# Patient Record
Sex: FEMALE | Race: OTHER | HISPANIC OR LATINO | URBAN - METROPOLITAN AREA
[De-identification: names, ages, dates, MRNs, and addresses within clinical notes are randomized per-mention and may not be internally consistent; named-entity substitution may affect disease eponyms.]

---

## 2017-09-27 ENCOUNTER — EMERGENCY (EMERGENCY)
Facility: HOSPITAL | Age: 27
LOS: 1 days | Discharge: HOME | End: 2017-09-27
Attending: EMERGENCY MEDICINE
Payer: COMMERCIAL

## 2017-09-27 VITALS
OXYGEN SATURATION: 98 % | WEIGHT: 145.95 LBS | DIASTOLIC BLOOD PRESSURE: 81 MMHG | RESPIRATION RATE: 18 BRPM | HEIGHT: 61 IN | SYSTOLIC BLOOD PRESSURE: 116 MMHG | HEART RATE: 110 BPM | TEMPERATURE: 100 F

## 2017-09-27 LAB
ALBUMIN SERPL ELPH-MCNC: 4.2 G/DL — SIGNIFICANT CHANGE UP (ref 3.3–5.2)
ALP SERPL-CCNC: 61 U/L — SIGNIFICANT CHANGE UP (ref 40–120)
ALT FLD-CCNC: 55 U/L — HIGH
ANION GAP SERPL CALC-SCNC: 14 MMOL/L — SIGNIFICANT CHANGE UP (ref 5–17)
APPEARANCE UR: CLEAR — SIGNIFICANT CHANGE UP
AST SERPL-CCNC: 39 U/L — HIGH
BACTERIA # UR AUTO: ABNORMAL
BASOPHILS # BLD AUTO: 0 K/UL — SIGNIFICANT CHANGE UP (ref 0–0.2)
BASOPHILS NFR BLD AUTO: 0.6 % — SIGNIFICANT CHANGE UP (ref 0–2)
BILIRUB SERPL-MCNC: 0.1 MG/DL — LOW (ref 0.4–2)
BILIRUB UR-MCNC: NEGATIVE — SIGNIFICANT CHANGE UP
BUN SERPL-MCNC: 12 MG/DL — SIGNIFICANT CHANGE UP (ref 8–20)
CALCIUM SERPL-MCNC: 9 MG/DL — SIGNIFICANT CHANGE UP (ref 8.6–10.2)
CHLORIDE SERPL-SCNC: 100 MMOL/L — SIGNIFICANT CHANGE UP (ref 98–107)
CO2 SERPL-SCNC: 24 MMOL/L — SIGNIFICANT CHANGE UP (ref 22–29)
COLOR SPEC: YELLOW — SIGNIFICANT CHANGE UP
CREAT SERPL-MCNC: 0.76 MG/DL — SIGNIFICANT CHANGE UP (ref 0.5–1.3)
DIFF PNL FLD: ABNORMAL
EOSINOPHIL # BLD AUTO: 0.2 K/UL — SIGNIFICANT CHANGE UP (ref 0–0.5)
EOSINOPHIL NFR BLD AUTO: 3 % — SIGNIFICANT CHANGE UP (ref 0–6)
EPI CELLS # UR: ABNORMAL
GLUCOSE SERPL-MCNC: 98 MG/DL — SIGNIFICANT CHANGE UP (ref 70–115)
GLUCOSE UR QL: NEGATIVE MG/DL — SIGNIFICANT CHANGE UP
HCG UR QL: NEGATIVE — SIGNIFICANT CHANGE UP
HCT VFR BLD CALC: 41.2 % — SIGNIFICANT CHANGE UP (ref 37–47)
HGB BLD-MCNC: 13.7 G/DL — SIGNIFICANT CHANGE UP (ref 12–16)
KETONES UR-MCNC: NEGATIVE — SIGNIFICANT CHANGE UP
LEUKOCYTE ESTERASE UR-ACNC: ABNORMAL
LIDOCAIN IGE QN: 28 U/L — SIGNIFICANT CHANGE UP (ref 22–51)
LYMPHOCYTES # BLD AUTO: 1.8 K/UL — SIGNIFICANT CHANGE UP (ref 1–4.8)
LYMPHOCYTES # BLD AUTO: 29.2 % — SIGNIFICANT CHANGE UP (ref 20–55)
MCHC RBC-ENTMCNC: 28 PG — SIGNIFICANT CHANGE UP (ref 27–31)
MCHC RBC-ENTMCNC: 33.3 G/DL — SIGNIFICANT CHANGE UP (ref 32–36)
MCV RBC AUTO: 84.3 FL — SIGNIFICANT CHANGE UP (ref 81–99)
MONOCYTES # BLD AUTO: 0.5 K/UL — SIGNIFICANT CHANGE UP (ref 0–0.8)
MONOCYTES NFR BLD AUTO: 7.8 % — SIGNIFICANT CHANGE UP (ref 3–10)
NEUTROPHILS # BLD AUTO: 3.7 K/UL — SIGNIFICANT CHANGE UP (ref 1.8–8)
NEUTROPHILS NFR BLD AUTO: 59.1 % — SIGNIFICANT CHANGE UP (ref 37–73)
NITRITE UR-MCNC: NEGATIVE — SIGNIFICANT CHANGE UP
PH UR: 6 — SIGNIFICANT CHANGE UP (ref 5–8)
PLATELET # BLD AUTO: 239 K/UL — SIGNIFICANT CHANGE UP (ref 150–400)
POTASSIUM SERPL-MCNC: 4 MMOL/L — SIGNIFICANT CHANGE UP (ref 3.5–5.3)
POTASSIUM SERPL-SCNC: 4 MMOL/L — SIGNIFICANT CHANGE UP (ref 3.5–5.3)
PROT SERPL-MCNC: 8 G/DL — SIGNIFICANT CHANGE UP (ref 6.6–8.7)
PROT UR-MCNC: 15 MG/DL
RBC # BLD: 4.89 M/UL — SIGNIFICANT CHANGE UP (ref 4.4–5.2)
RBC # FLD: 13.5 % — SIGNIFICANT CHANGE UP (ref 11–15.6)
RBC CASTS # UR COMP ASSIST: ABNORMAL /HPF (ref 0–4)
SODIUM SERPL-SCNC: 138 MMOL/L — SIGNIFICANT CHANGE UP (ref 135–145)
SP GR SPEC: 1.02 — SIGNIFICANT CHANGE UP (ref 1.01–1.02)
UROBILINOGEN FLD QL: NEGATIVE MG/DL — SIGNIFICANT CHANGE UP
WBC # BLD: 6.3 K/UL — SIGNIFICANT CHANGE UP (ref 4.8–10.8)
WBC # FLD AUTO: 6.3 K/UL — SIGNIFICANT CHANGE UP (ref 4.8–10.8)
WBC UR QL: ABNORMAL

## 2017-09-27 PROCEDURE — 99284 EMERGENCY DEPT VISIT MOD MDM: CPT

## 2017-09-27 PROCEDURE — 80053 COMPREHEN METABOLIC PANEL: CPT

## 2017-09-27 PROCEDURE — 81025 URINE PREGNANCY TEST: CPT

## 2017-09-27 PROCEDURE — 83690 ASSAY OF LIPASE: CPT

## 2017-09-27 PROCEDURE — 81001 URINALYSIS AUTO W/SCOPE: CPT

## 2017-09-27 PROCEDURE — 36415 COLL VENOUS BLD VENIPUNCTURE: CPT

## 2017-09-27 PROCEDURE — 85027 COMPLETE CBC AUTOMATED: CPT

## 2017-09-27 PROCEDURE — 99284 EMERGENCY DEPT VISIT MOD MDM: CPT | Mod: 25

## 2017-09-27 PROCEDURE — 96374 THER/PROPH/DIAG INJ IV PUSH: CPT

## 2017-09-27 PROCEDURE — 96375 TX/PRO/DX INJ NEW DRUG ADDON: CPT

## 2017-09-27 RX ORDER — SODIUM CHLORIDE 9 MG/ML
1000 INJECTION INTRAMUSCULAR; INTRAVENOUS; SUBCUTANEOUS ONCE
Qty: 0 | Refills: 0 | Status: COMPLETED | OUTPATIENT
Start: 2017-09-27 | End: 2017-09-27

## 2017-09-27 RX ORDER — METOCLOPRAMIDE HCL 10 MG
10 TABLET ORAL ONCE
Qty: 0 | Refills: 0 | Status: COMPLETED | OUTPATIENT
Start: 2017-09-27 | End: 2017-09-27

## 2017-09-27 RX ORDER — FAMOTIDINE 10 MG/ML
20 INJECTION INTRAVENOUS ONCE
Qty: 0 | Refills: 0 | Status: COMPLETED | OUTPATIENT
Start: 2017-09-27 | End: 2017-09-27

## 2017-09-27 RX ORDER — KETOROLAC TROMETHAMINE 30 MG/ML
30 SYRINGE (ML) INJECTION ONCE
Qty: 0 | Refills: 0 | Status: DISCONTINUED | OUTPATIENT
Start: 2017-09-27 | End: 2017-09-27

## 2017-09-27 RX ORDER — SODIUM CHLORIDE 9 MG/ML
3 INJECTION INTRAMUSCULAR; INTRAVENOUS; SUBCUTANEOUS ONCE
Qty: 0 | Refills: 0 | Status: COMPLETED | OUTPATIENT
Start: 2017-09-27 | End: 2017-09-27

## 2017-09-27 RX ADMIN — Medication 30 MILLIGRAM(S): at 23:48

## 2017-09-27 RX ADMIN — Medication 104 MILLIGRAM(S): at 23:48

## 2017-09-27 RX ADMIN — SODIUM CHLORIDE 1000 MILLILITER(S): 9 INJECTION INTRAMUSCULAR; INTRAVENOUS; SUBCUTANEOUS at 23:48

## 2017-09-27 RX ADMIN — FAMOTIDINE 20 MILLIGRAM(S): 10 INJECTION INTRAVENOUS at 23:48

## 2017-09-27 RX ADMIN — SODIUM CHLORIDE 3 MILLILITER(S): 9 INJECTION INTRAMUSCULAR; INTRAVENOUS; SUBCUTANEOUS at 23:31

## 2017-09-27 NOTE — ED ADULT NURSE NOTE - OBJECTIVE STATEMENT
Pt. complaining of headache, dizziness, vomiting x 2 weeks.  Pt. states it started with her "throwing up all the food I was eating and then I started to get a cold and I developed a headache that has now traveled down my neck.  And recently my stomach has been bothering me".  Pt denies diarrhea.  Denies hematuria/dysuria.  Denies nausea.  Pt. states "it felt like I was burning up at work all day".  Pt. complaining of right lower quadrant tenderness upon palpation.

## 2017-09-27 NOTE — ED PROVIDER NOTE - OBJECTIVE STATEMENT
26 y/o F presents to ED c/o HA x 2 weeks. Associated Sx neck pain, LLQ pain x 2 days, N/V, decreased appetite, fever and dizziness. LNMP was end of August. Non-smoker. Non-alcohol drinker. Denies vaginal bleeding/ discharge, diarrhea, chills, SOB, CP, difficulty breathing, numbness and tingling.  PCP: Dr. Perez.

## 2017-09-27 NOTE — ED PROVIDER NOTE - MEDICAL DECISION MAKING DETAILS
26 y/o F presents to ED c/o HA and abdominal pain will get labs, IV fluids, Raglan, Toradol and re-evaluate.

## 2017-09-27 NOTE — ED ADULT TRIAGE NOTE - CHIEF COMPLAINT QUOTE
pt c/o migraine headaches, now radiating to neck, and lower abd pains. also feeling faint, chills, vomiting and no appetite. symptoms for 2 weeks. negative pregnancy test at home.

## 2017-09-28 VITALS
SYSTOLIC BLOOD PRESSURE: 111 MMHG | OXYGEN SATURATION: 100 % | RESPIRATION RATE: 18 BRPM | DIASTOLIC BLOOD PRESSURE: 76 MMHG | TEMPERATURE: 98 F | HEART RATE: 88 BPM

## 2018-11-29 ENCOUNTER — EMERGENCY (EMERGENCY)
Facility: HOSPITAL | Age: 28
LOS: 1 days | Discharge: HOME | End: 2018-11-29
Attending: STUDENT IN AN ORGANIZED HEALTH CARE EDUCATION/TRAINING PROGRAM
Payer: MEDICAID

## 2018-11-29 VITALS
SYSTOLIC BLOOD PRESSURE: 125 MMHG | TEMPERATURE: 98 F | HEIGHT: 61 IN | OXYGEN SATURATION: 100 % | HEART RATE: 88 BPM | WEIGHT: 160.06 LBS | DIASTOLIC BLOOD PRESSURE: 83 MMHG | RESPIRATION RATE: 18 BRPM

## 2018-11-29 PROCEDURE — 99284 EMERGENCY DEPT VISIT MOD MDM: CPT | Mod: 25

## 2018-11-30 VITALS
TEMPERATURE: 98 F | HEART RATE: 77 BPM | SYSTOLIC BLOOD PRESSURE: 108 MMHG | OXYGEN SATURATION: 98 % | RESPIRATION RATE: 16 BRPM | DIASTOLIC BLOOD PRESSURE: 75 MMHG

## 2018-11-30 LAB
ALBUMIN SERPL ELPH-MCNC: 4.2 G/DL — SIGNIFICANT CHANGE UP (ref 3.3–5.2)
ALP SERPL-CCNC: 51 U/L — SIGNIFICANT CHANGE UP (ref 40–120)
ALT FLD-CCNC: 47 U/L — HIGH
ANION GAP SERPL CALC-SCNC: 12 MMOL/L — SIGNIFICANT CHANGE UP (ref 5–17)
APPEARANCE UR: CLEAR — SIGNIFICANT CHANGE UP
AST SERPL-CCNC: 32 U/L — HIGH
BACTERIA # UR AUTO: ABNORMAL
BASOPHILS # BLD AUTO: 0 K/UL — SIGNIFICANT CHANGE UP (ref 0–0.2)
BASOPHILS NFR BLD AUTO: 0.2 % — SIGNIFICANT CHANGE UP (ref 0–2)
BILIRUB SERPL-MCNC: 0.2 MG/DL — LOW (ref 0.4–2)
BILIRUB UR-MCNC: NEGATIVE — SIGNIFICANT CHANGE UP
BUN SERPL-MCNC: 10 MG/DL — SIGNIFICANT CHANGE UP (ref 8–20)
C TRACH RRNA SPEC QL NAA+PROBE: SIGNIFICANT CHANGE UP
CALCIUM SERPL-MCNC: 8.9 MG/DL — SIGNIFICANT CHANGE UP (ref 8.6–10.2)
CHLORIDE SERPL-SCNC: 105 MMOL/L — SIGNIFICANT CHANGE UP (ref 98–107)
CO2 SERPL-SCNC: 23 MMOL/L — SIGNIFICANT CHANGE UP (ref 22–29)
COLOR SPEC: YELLOW — SIGNIFICANT CHANGE UP
CREAT SERPL-MCNC: 0.55 MG/DL — SIGNIFICANT CHANGE UP (ref 0.5–1.3)
DIFF PNL FLD: ABNORMAL
EOSINOPHIL # BLD AUTO: 0.4 K/UL — SIGNIFICANT CHANGE UP (ref 0–0.5)
EOSINOPHIL NFR BLD AUTO: 3.2 % — SIGNIFICANT CHANGE UP (ref 0–6)
EPI CELLS # UR: SIGNIFICANT CHANGE UP
GLUCOSE SERPL-MCNC: 109 MG/DL — SIGNIFICANT CHANGE UP (ref 70–115)
GLUCOSE UR QL: NEGATIVE MG/DL — SIGNIFICANT CHANGE UP
HCG UR QL: NEGATIVE — SIGNIFICANT CHANGE UP
HCT VFR BLD CALC: 42.8 % — SIGNIFICANT CHANGE UP (ref 37–47)
HGB BLD-MCNC: 13.7 G/DL — SIGNIFICANT CHANGE UP (ref 12–16)
KETONES UR-MCNC: NEGATIVE — SIGNIFICANT CHANGE UP
LEUKOCYTE ESTERASE UR-ACNC: ABNORMAL
LIDOCAIN IGE QN: 33 U/L — SIGNIFICANT CHANGE UP (ref 22–51)
LYMPHOCYTES # BLD AUTO: 4.7 K/UL — SIGNIFICANT CHANGE UP (ref 1–4.8)
LYMPHOCYTES # BLD AUTO: 41.2 % — SIGNIFICANT CHANGE UP (ref 20–55)
MCHC RBC-ENTMCNC: 28.3 PG — SIGNIFICANT CHANGE UP (ref 27–31)
MCHC RBC-ENTMCNC: 32 G/DL — SIGNIFICANT CHANGE UP (ref 32–36)
MCV RBC AUTO: 88.4 FL — SIGNIFICANT CHANGE UP (ref 81–99)
MONOCYTES # BLD AUTO: 0.6 K/UL — SIGNIFICANT CHANGE UP (ref 0–0.8)
MONOCYTES NFR BLD AUTO: 4.8 % — SIGNIFICANT CHANGE UP (ref 3–10)
N GONORRHOEA RRNA SPEC QL NAA+PROBE: SIGNIFICANT CHANGE UP
NEUTROPHILS # BLD AUTO: 5.8 K/UL — SIGNIFICANT CHANGE UP (ref 1.8–8)
NEUTROPHILS NFR BLD AUTO: 50.3 % — SIGNIFICANT CHANGE UP (ref 37–73)
NITRITE UR-MCNC: NEGATIVE — SIGNIFICANT CHANGE UP
PH UR: 6 — SIGNIFICANT CHANGE UP (ref 5–8)
PLATELET # BLD AUTO: 270 K/UL — SIGNIFICANT CHANGE UP (ref 150–400)
POTASSIUM SERPL-MCNC: 4 MMOL/L — SIGNIFICANT CHANGE UP (ref 3.5–5.3)
POTASSIUM SERPL-SCNC: 4 MMOL/L — SIGNIFICANT CHANGE UP (ref 3.5–5.3)
PROT SERPL-MCNC: 7.5 G/DL — SIGNIFICANT CHANGE UP (ref 6.6–8.7)
PROT UR-MCNC: 15 MG/DL
RBC # BLD: 4.84 M/UL — SIGNIFICANT CHANGE UP (ref 4.4–5.2)
RBC # FLD: 13.3 % — SIGNIFICANT CHANGE UP (ref 11–15.6)
RBC CASTS # UR COMP ASSIST: SIGNIFICANT CHANGE UP /HPF (ref 0–4)
SODIUM SERPL-SCNC: 140 MMOL/L — SIGNIFICANT CHANGE UP (ref 135–145)
SP GR SPEC: 1.02 — SIGNIFICANT CHANGE UP (ref 1.01–1.02)
SPECIMEN SOURCE: SIGNIFICANT CHANGE UP
UROBILINOGEN FLD QL: NEGATIVE MG/DL — SIGNIFICANT CHANGE UP
WBC # BLD: 11.5 K/UL — HIGH (ref 4.8–10.8)
WBC # FLD AUTO: 11.5 K/UL — HIGH (ref 4.8–10.8)
WBC UR QL: SIGNIFICANT CHANGE UP

## 2018-11-30 PROCEDURE — 36415 COLL VENOUS BLD VENIPUNCTURE: CPT

## 2018-11-30 PROCEDURE — 81001 URINALYSIS AUTO W/SCOPE: CPT

## 2018-11-30 PROCEDURE — 74177 CT ABD & PELVIS W/CONTRAST: CPT | Mod: 26

## 2018-11-30 PROCEDURE — 80053 COMPREHEN METABOLIC PANEL: CPT

## 2018-11-30 PROCEDURE — 87491 CHLMYD TRACH DNA AMP PROBE: CPT

## 2018-11-30 PROCEDURE — 85027 COMPLETE CBC AUTOMATED: CPT

## 2018-11-30 PROCEDURE — 74177 CT ABD & PELVIS W/CONTRAST: CPT

## 2018-11-30 PROCEDURE — 99284 EMERGENCY DEPT VISIT MOD MDM: CPT | Mod: 25

## 2018-11-30 PROCEDURE — 87591 N.GONORRHOEAE DNA AMP PROB: CPT

## 2018-11-30 PROCEDURE — 96374 THER/PROPH/DIAG INJ IV PUSH: CPT | Mod: XU

## 2018-11-30 PROCEDURE — 83690 ASSAY OF LIPASE: CPT

## 2018-11-30 PROCEDURE — 96375 TX/PRO/DX INJ NEW DRUG ADDON: CPT

## 2018-11-30 PROCEDURE — 81025 URINE PREGNANCY TEST: CPT

## 2018-11-30 PROCEDURE — 96361 HYDRATE IV INFUSION ADD-ON: CPT

## 2018-11-30 RX ORDER — SODIUM CHLORIDE 9 MG/ML
1000 INJECTION INTRAMUSCULAR; INTRAVENOUS; SUBCUTANEOUS ONCE
Qty: 0 | Refills: 0 | Status: COMPLETED | OUTPATIENT
Start: 2018-11-30 | End: 2018-11-30

## 2018-11-30 RX ORDER — ONDANSETRON 8 MG/1
4 TABLET, FILM COATED ORAL ONCE
Qty: 0 | Refills: 0 | Status: COMPLETED | OUTPATIENT
Start: 2018-11-30 | End: 2018-11-30

## 2018-11-30 RX ORDER — SODIUM CHLORIDE 9 MG/ML
1000 INJECTION INTRAMUSCULAR; INTRAVENOUS; SUBCUTANEOUS
Qty: 0 | Refills: 0 | Status: DISCONTINUED | OUTPATIENT
Start: 2018-11-30 | End: 2018-12-04

## 2018-11-30 RX ORDER — OMEPRAZOLE 10 MG/1
1 CAPSULE, DELAYED RELEASE ORAL
Qty: 14 | Refills: 0
Start: 2018-11-30 | End: 2018-12-13

## 2018-11-30 RX ORDER — KETOROLAC TROMETHAMINE 30 MG/ML
30 SYRINGE (ML) INJECTION ONCE
Qty: 0 | Refills: 0 | Status: DISCONTINUED | OUTPATIENT
Start: 2018-11-30 | End: 2018-11-30

## 2018-11-30 RX ORDER — IBUPROFEN 200 MG
1 TABLET ORAL
Qty: 20 | Refills: 0
Start: 2018-11-30 | End: 2018-12-04

## 2018-11-30 RX ORDER — SODIUM CHLORIDE 9 MG/ML
3 INJECTION INTRAMUSCULAR; INTRAVENOUS; SUBCUTANEOUS ONCE
Qty: 0 | Refills: 0 | Status: COMPLETED | OUTPATIENT
Start: 2018-11-30 | End: 2018-11-30

## 2018-11-30 RX ADMIN — Medication 30 MILLIGRAM(S): at 05:08

## 2018-11-30 RX ADMIN — SODIUM CHLORIDE 3 MILLILITER(S): 9 INJECTION INTRAMUSCULAR; INTRAVENOUS; SUBCUTANEOUS at 03:05

## 2018-11-30 RX ADMIN — ONDANSETRON 4 MILLIGRAM(S): 8 TABLET, FILM COATED ORAL at 05:08

## 2018-11-30 RX ADMIN — SODIUM CHLORIDE 1000 MILLILITER(S): 9 INJECTION INTRAMUSCULAR; INTRAVENOUS; SUBCUTANEOUS at 03:05

## 2018-11-30 RX ADMIN — SODIUM CHLORIDE 1000 MILLILITER(S): 9 INJECTION INTRAMUSCULAR; INTRAVENOUS; SUBCUTANEOUS at 04:30

## 2018-11-30 RX ADMIN — SODIUM CHLORIDE 125 MILLILITER(S): 9 INJECTION INTRAMUSCULAR; INTRAVENOUS; SUBCUTANEOUS at 05:11

## 2018-11-30 NOTE — ED PROVIDER NOTE - MEDICAL DECISION MAKING DETAILS
Will evaluate patient's chronic abdominal pain with CT and labs, if no acute process noted, will have patient followup with PMD.

## 2018-11-30 NOTE — ED ADULT NURSE REASSESSMENT NOTE - NS ED NURSE REASSESS COMMENT FT1
results received and reviewed with patient by Dr Fernandes. Discharge instructions received and reviewed with patient, patient verbalized understanding of all instructions given. Iv removed, safety maintained.

## 2018-11-30 NOTE — ED PROVIDER NOTE - GASTROINTESTINAL, MLM
Abdomen soft, non-tender, no guarding. Abdomen soft, diffuse abdominal tenderness, no guarding or rebound.

## 2018-11-30 NOTE — ED ADULT NURSE NOTE - OBJECTIVE STATEMENT
assumed care of patient at 0300, alert and oriented x4, c/o LLQ abdominal pain x2 weeks worsening tonight. c/o nausea, denies vomiting. Patient seen and evaluated by MD, Iv placed labs sent, clear yellow urine sample obtained and sent. assumed care of patient at 0300, alert and oriented x4, c/o LLQ abdominal pain x2 weeks worsening tonight. c/o nausea, denies vomiting. Patient seen and evaluated by MD, Iv placed labs sent, clear yellow urine sample obtained and sent. pt educated on plan of care, pt able to successfully teach back plan of care to RN, RN will continue to reeducate pt during hospital stay.

## 2018-11-30 NOTE — ED ADULT NURSE NOTE - CHPI ED NUR SYMPTOMS NEG
no abdominal distension/no vomiting/no diarrhea/no burning urination/no hematuria/no chills/no dysuria/no fever/no blood in stool

## 2018-11-30 NOTE — ED PROVIDER NOTE - CONSTITUTIONAL, MLM
normal... Well appearing, well nourished, awake, alert, oriented to person, place, time/situation and in no apparent distress. Well appearing, well nourished and in no apparent distress.

## 2018-11-30 NOTE — ED PROVIDER NOTE - OBJECTIVE STATEMENT
27 y/o F presents to ED c/o abdominal pain, bloating and intermittent hematuria onset 2 weeks ago. Is also experiencing nausea, dizziness described as room spinning sensation and headaches. Has a history of GI related problems, has been evaluated by her PMD, but was told that "nothing was wrong". States that due to insurance complications, she has not seen a doctor in over 6 months. Has been taking Tylenol, Advil, and drinking ginger ale and tea with no relief. Denies recent fevers or chills, vomiting, diarrhea, blood in the stool or pain/burning with urination. NKDA. No further acute complaints at this time. 27 y/o F presents to ED c/o abdominal pain, bloating and intermittent hematuria onset several months ago, worsening and becoming more severe over the past 2 weeks. Is also experiencing nausea, dizziness described as room spinning sensation and headaches. Has a history of GI related problems, has been evaluated by her PMD, but was told that "nothing was wrong". States that due to insurance complications, she has not seen a doctor in over 6 months. Has been taking Tylenol, Advil, and drinking ginger ale and tea with no relief. Denies recent fevers or chills, vomiting, diarrhea, blood in the stool or pain/burning with urination. NKDA. No further acute complaints at this time. 27 y/o F presents to ED c/o abdominal pain, bloating and intermittent hematuria onset several months ago, worsening and becoming more severe over the past 2 weeks. Is also experiencing nausea, dizziness described as room spinning sensation and headaches. Has a history of GI related problems, has been evaluated by her PMD, but was told that "nothing was wrong" but states that due to insurance complications, she has not seen a doctor in over 6 months. Has been taking Tylenol, Advil, and drinking ginger ale and tea with no relief. Denies recent fevers or chills, vomiting, diarrhea, blood in the stool or pain/burning with urination. NKDA. No further acute complaints at this time.

## 2019-02-25 ENCOUNTER — ASOB RESULT (OUTPATIENT)
Age: 29
End: 2019-02-25

## 2019-02-25 ENCOUNTER — APPOINTMENT (OUTPATIENT)
Dept: ANTEPARTUM | Facility: CLINIC | Age: 29
End: 2019-02-25
Payer: MEDICAID

## 2019-02-25 PROCEDURE — 76813 OB US NUCHAL MEAS 1 GEST: CPT

## 2019-02-25 PROCEDURE — 36416 COLLJ CAPILLARY BLOOD SPEC: CPT

## 2019-03-01 LAB
1ST TRIMESTER DATA: NORMAL
ADDENDUM DOC: NORMAL
AFP PNL SERPL: NORMAL
AFP SERPL-ACNC: NORMAL
CLINICAL BIOCHEMIST REVIEW: NORMAL
FREE BETA HCG 1ST TRIMESTER: NORMAL
Lab: NORMAL
NASAL BONE: PRESENT
NOTES NTD: NORMAL
NT: NORMAL
PAPP-A SERPL-ACNC: NORMAL
TRISOMY 18/3: NORMAL

## 2019-03-06 ENCOUNTER — EMERGENCY (EMERGENCY)
Facility: HOSPITAL | Age: 29
LOS: 1 days | Discharge: HOME | End: 2019-03-06
Attending: EMERGENCY MEDICINE
Payer: COMMERCIAL

## 2019-03-06 VITALS
OXYGEN SATURATION: 100 % | RESPIRATION RATE: 18 BRPM | SYSTOLIC BLOOD PRESSURE: 116 MMHG | HEIGHT: 61 IN | WEIGHT: 169.98 LBS | DIASTOLIC BLOOD PRESSURE: 73 MMHG | HEART RATE: 87 BPM | TEMPERATURE: 98 F

## 2019-03-06 PROCEDURE — 99284 EMERGENCY DEPT VISIT MOD MDM: CPT | Mod: 25

## 2019-03-06 RX ORDER — METOCLOPRAMIDE HCL 10 MG
10 TABLET ORAL ONCE
Qty: 0 | Refills: 0 | Status: COMPLETED | OUTPATIENT
Start: 2019-03-06 | End: 2019-03-06

## 2019-03-06 RX ORDER — SODIUM CHLORIDE 9 MG/ML
1000 INJECTION INTRAMUSCULAR; INTRAVENOUS; SUBCUTANEOUS ONCE
Qty: 0 | Refills: 0 | Status: COMPLETED | OUTPATIENT
Start: 2019-03-06 | End: 2019-03-06

## 2019-03-06 NOTE — ED ADULT TRIAGE NOTE - CHIEF COMPLAINT QUOTE
I am 13 weeks preg.  my boiler is being fix we have no heat.  I can smel the gas in the house. I am having abd pain and vomiting

## 2019-03-07 VITALS
TEMPERATURE: 99 F | SYSTOLIC BLOOD PRESSURE: 113 MMHG | OXYGEN SATURATION: 98 % | RESPIRATION RATE: 18 BRPM | DIASTOLIC BLOOD PRESSURE: 75 MMHG | HEART RATE: 95 BPM

## 2019-03-07 LAB
ALBUMIN SERPL ELPH-MCNC: 3.4 G/DL — SIGNIFICANT CHANGE UP (ref 3.3–5.2)
ALP SERPL-CCNC: 42 U/L — SIGNIFICANT CHANGE UP (ref 40–120)
ALT FLD-CCNC: 30 U/L — SIGNIFICANT CHANGE UP
ANION GAP SERPL CALC-SCNC: 11 MMOL/L — SIGNIFICANT CHANGE UP (ref 5–17)
APPEARANCE UR: CLEAR — SIGNIFICANT CHANGE UP
AST SERPL-CCNC: 30 U/L — SIGNIFICANT CHANGE UP
BASOPHILS # BLD AUTO: 0 K/UL — SIGNIFICANT CHANGE UP (ref 0–0.2)
BASOPHILS NFR BLD AUTO: 0.2 % — SIGNIFICANT CHANGE UP (ref 0–2)
BILIRUB SERPL-MCNC: <0.2 MG/DL — LOW (ref 0.4–2)
BILIRUB UR-MCNC: NEGATIVE — SIGNIFICANT CHANGE UP
BUN SERPL-MCNC: 4 MG/DL — LOW (ref 8–20)
CALCIUM SERPL-MCNC: 8.7 MG/DL — SIGNIFICANT CHANGE UP (ref 8.6–10.2)
CHLORIDE SERPL-SCNC: 103 MMOL/L — SIGNIFICANT CHANGE UP (ref 98–107)
CO2 SERPL-SCNC: 21 MMOL/L — LOW (ref 22–29)
COHGB MFR BLDV: 1.8 % — SIGNIFICANT CHANGE UP (ref 0–2)
COLOR SPEC: YELLOW — SIGNIFICANT CHANGE UP
CREAT SERPL-MCNC: 0.35 MG/DL — LOW (ref 0.5–1.3)
DIFF PNL FLD: NEGATIVE — SIGNIFICANT CHANGE UP
EOSINOPHIL # BLD AUTO: 0.3 K/UL — SIGNIFICANT CHANGE UP (ref 0–0.5)
EOSINOPHIL NFR BLD AUTO: 2.4 % — SIGNIFICANT CHANGE UP (ref 0–6)
EPI CELLS # UR: SIGNIFICANT CHANGE UP
GLUCOSE SERPL-MCNC: 96 MG/DL — SIGNIFICANT CHANGE UP (ref 70–115)
GLUCOSE UR QL: NEGATIVE MG/DL — SIGNIFICANT CHANGE UP
HCT VFR BLD CALC: 37.9 % — SIGNIFICANT CHANGE UP (ref 37–47)
HGB BLD CALC-MCNC: 12.1 G/DL — SIGNIFICANT CHANGE UP (ref 11.5–15.5)
HGB BLD-MCNC: 12.5 G/DL — SIGNIFICANT CHANGE UP (ref 12–16)
KETONES UR-MCNC: ABNORMAL
LEUKOCYTE ESTERASE UR-ACNC: ABNORMAL
LIDOCAIN IGE QN: 25 U/L — SIGNIFICANT CHANGE UP (ref 22–51)
LYMPHOCYTES # BLD AUTO: 32.1 % — SIGNIFICANT CHANGE UP (ref 20–55)
LYMPHOCYTES # BLD AUTO: 4.2 K/UL — SIGNIFICANT CHANGE UP (ref 1–4.8)
MCHC RBC-ENTMCNC: 27.9 PG — SIGNIFICANT CHANGE UP (ref 27–31)
MCHC RBC-ENTMCNC: 33 G/DL — SIGNIFICANT CHANGE UP (ref 32–36)
MCV RBC AUTO: 84.6 FL — SIGNIFICANT CHANGE UP (ref 81–99)
MONOCYTES # BLD AUTO: 0.7 K/UL — SIGNIFICANT CHANGE UP (ref 0–0.8)
MONOCYTES NFR BLD AUTO: 5.2 % — SIGNIFICANT CHANGE UP (ref 3–10)
NEUTROPHILS # BLD AUTO: 7.7 K/UL — SIGNIFICANT CHANGE UP (ref 1.8–8)
NEUTROPHILS NFR BLD AUTO: 59.1 % — SIGNIFICANT CHANGE UP (ref 37–73)
NITRITE UR-MCNC: NEGATIVE — SIGNIFICANT CHANGE UP
PH UR: 7 — SIGNIFICANT CHANGE UP (ref 5–8)
PLATELET # BLD AUTO: 271 K/UL — SIGNIFICANT CHANGE UP (ref 150–400)
POTASSIUM SERPL-MCNC: 3.8 MMOL/L — SIGNIFICANT CHANGE UP (ref 3.5–5.3)
POTASSIUM SERPL-SCNC: 3.8 MMOL/L — SIGNIFICANT CHANGE UP (ref 3.5–5.3)
PROT SERPL-MCNC: 6.7 G/DL — SIGNIFICANT CHANGE UP (ref 6.6–8.7)
PROT UR-MCNC: NEGATIVE MG/DL — SIGNIFICANT CHANGE UP
RBC # BLD: 4.48 M/UL — SIGNIFICANT CHANGE UP (ref 4.4–5.2)
RBC # FLD: 13.2 % — SIGNIFICANT CHANGE UP (ref 11–15.6)
RBC CASTS # UR COMP ASSIST: SIGNIFICANT CHANGE UP /HPF (ref 0–4)
SODIUM SERPL-SCNC: 135 MMOL/L — SIGNIFICANT CHANGE UP (ref 135–145)
SP GR SPEC: 1 — LOW (ref 1.01–1.02)
UROBILINOGEN FLD QL: NEGATIVE MG/DL — SIGNIFICANT CHANGE UP
WBC # BLD: 13.1 K/UL — HIGH (ref 4.8–10.8)
WBC # FLD AUTO: 13.1 K/UL — HIGH (ref 4.8–10.8)
WBC UR QL: SIGNIFICANT CHANGE UP

## 2019-03-07 PROCEDURE — 82375 ASSAY CARBOXYHB QUANT: CPT

## 2019-03-07 PROCEDURE — 36415 COLL VENOUS BLD VENIPUNCTURE: CPT

## 2019-03-07 PROCEDURE — 96361 HYDRATE IV INFUSION ADD-ON: CPT

## 2019-03-07 PROCEDURE — 80053 COMPREHEN METABOLIC PANEL: CPT

## 2019-03-07 PROCEDURE — 99284 EMERGENCY DEPT VISIT MOD MDM: CPT | Mod: 25

## 2019-03-07 PROCEDURE — 76817 TRANSVAGINAL US OBSTETRIC: CPT | Mod: 26

## 2019-03-07 PROCEDURE — 85027 COMPLETE CBC AUTOMATED: CPT

## 2019-03-07 PROCEDURE — 83690 ASSAY OF LIPASE: CPT

## 2019-03-07 PROCEDURE — 76801 OB US < 14 WKS SINGLE FETUS: CPT

## 2019-03-07 PROCEDURE — 87086 URINE CULTURE/COLONY COUNT: CPT

## 2019-03-07 PROCEDURE — 76801 OB US < 14 WKS SINGLE FETUS: CPT | Mod: 26

## 2019-03-07 PROCEDURE — 96374 THER/PROPH/DIAG INJ IV PUSH: CPT

## 2019-03-07 PROCEDURE — 76817 TRANSVAGINAL US OBSTETRIC: CPT

## 2019-03-07 PROCEDURE — 81001 URINALYSIS AUTO W/SCOPE: CPT

## 2019-03-07 RX ORDER — ACETAMINOPHEN 500 MG
650 TABLET ORAL ONCE
Qty: 0 | Refills: 0 | Status: COMPLETED | OUTPATIENT
Start: 2019-03-07 | End: 2019-03-07

## 2019-03-07 RX ADMIN — Medication 10 MILLIGRAM(S): at 00:29

## 2019-03-07 RX ADMIN — SODIUM CHLORIDE 1000 MILLILITER(S): 9 INJECTION INTRAMUSCULAR; INTRAVENOUS; SUBCUTANEOUS at 01:47

## 2019-03-07 RX ADMIN — SODIUM CHLORIDE 1000 MILLILITER(S): 9 INJECTION INTRAMUSCULAR; INTRAVENOUS; SUBCUTANEOUS at 00:29

## 2019-03-07 NOTE — ED ADULT NURSE NOTE - NSIMPLEMENTINTERV_GEN_ALL_ED
Implemented All Universal Safety Interventions:  [place] to call system. Call bell, personal items and telephone within reach. Instruct patient to call for assistance. Room bathroom lighting operational. Non-slip footwear when patient is off stretcher. Physically safe environment: no spills, clutter or unnecessary equipment. Stretcher in lowest position, wheels locked, appropriate side rails in place.

## 2019-03-07 NOTE — ED PROVIDER NOTE - CHPI ED SYMPTOMS NEG
no fever/no diarrhea/no dysuria/no hematuria/no burning urination/no chills no diarrhea/no fever/no dysuria/no burning urination/no vomiting/no hematuria/no chills

## 2019-03-07 NOTE — ED ADULT NURSE NOTE - OBJECTIVE STATEMENT
Assumed pt care 0020. Pt lying in bed, no acute distress noted, son and  at bedside. Pt A+OX3. Pt states today "someone came over to fix the boiler, gasoline smell was throughout the entire house, I began having a headache and abdominal pain, I was scared because I am pregnant." On assessment pt with clear lung sounds b/l, abdomen soft, nontender, nondistended, pt denies abdominal pain currently. Pt states she still has headache 5/10. Pt medicated as ordered, and educated on plan of care: awaiting US. Pt able to successfully teach back plan of care to RN. RN will continue to reeducate pt during hospital stay.

## 2019-03-07 NOTE — ED PROVIDER NOTE - OBJECTIVE STATEMENT
27 y/o F, 13 weeks pregnant, with no pertinent medical hx, presents to the ED c/o lower abdominal pain, onset yesterday.  Pain is non radiating and dull and achy in nature.  Associated sx include dizziness, nausea and non bloody, non bilious emesis.  Pt states that she is currently living in her mother's basement and is currently having the boiler fixed.  Pt states that at approximately 6:00PM yesterday she began to smell gas.  Notes sx onset shortly after.   LNMP 12/1.  Denies fever, chills, chest pain, SOB, cough, diarrhea, dysuria, vaginal bleeding, vaginal discharge, back pain, or HA. 27 y/o F, , 13 weeks pregnant by LMP , with no pertinent medical hx, presents to the ED c/o lower abdominal pain, onset yesterday.  Pain is non radiating and dull and achy in nature.  Associated sx include dizziness, nausea but no vomiting.  Pt states that she is currently living in her mother's basement and is currently having the boiler fixed.  Pt states that at approximately 6:00PM yesterday she began to smell gas.  Notes sx onset shortly after. No other family had similar symptoms, pt was the only one at home. No sick contacts at home.  Denies fever, chills, chest pain, SOB, cough, diarrhea, dysuria, vaginal bleeding, vaginal discharge, back pain, or HA.

## 2019-03-07 NOTE — ED PROVIDER NOTE - ATTENDING CONTRIBUTION TO CARE
ATTENDING MD: VENUS, David Nolan, have seen and evaluated this patient with the advance practice clinician.  I have discussed the history, exam ,and aspects of care with the APC.  I have reviewed the APC's note. I agree with the findings  unless other wise stated in the HPI, PE, MDM, and progress notes.    GEN: awake, alert, nontoxic  HEENT: NCAT, eyes clear, mucus membranes are dry, neck supple  CV: normal rate, regular rhythm  RESP: unlabored, lungs clear to auscultation bilaterally, equal breath sounds bilaterally  GI: abdomen soft, nondistended, minimal suprapubic tenderness no RLQ or LLQ tenderness, no rebound, no guarding. benign abdomen  : no CVAT  SKIN: warm, dry, no rash,    MDM: likely gastro, will check UA, US for appropriate pregnancy location and r/o molar preg, supportive care, reassess    Charts made in real time. Please forgive typos.

## 2019-03-07 NOTE — ED PROVIDER NOTE - PROGRESS NOTE DETAILS
PA NOTE: labs wnl, pt feeling better at this time, no lightheadedness, no abdominal pain, u/s shows intrauterine pregnancy. pt to be d/c f/u with ob

## 2019-03-07 NOTE — ED PROVIDER NOTE - CLINICAL SUMMARY MEDICAL DECISION MAKING FREE TEXT BOX
Pt 13 weeks pregnant, will obtain US to confirm viable pregnancy, basic labs, give Tylenol and Reglan, and reeval

## 2019-03-08 LAB
CULTURE RESULTS: SIGNIFICANT CHANGE UP
SPECIMEN SOURCE: SIGNIFICANT CHANGE UP

## 2019-03-25 ENCOUNTER — LABORATORY RESULT (OUTPATIENT)
Age: 29
End: 2019-03-25

## 2019-03-25 ENCOUNTER — APPOINTMENT (OUTPATIENT)
Dept: ANTEPARTUM | Facility: CLINIC | Age: 29
End: 2019-03-25

## 2019-03-29 ENCOUNTER — APPOINTMENT (OUTPATIENT)
Dept: ANTEPARTUM | Facility: CLINIC | Age: 29
End: 2019-03-29
Payer: MEDICAID

## 2019-03-29 ENCOUNTER — APPOINTMENT (OUTPATIENT)
Dept: MATERNAL FETAL MEDICINE | Facility: CLINIC | Age: 29
End: 2019-03-29
Payer: MEDICAID

## 2019-03-29 ENCOUNTER — ASOB RESULT (OUTPATIENT)
Age: 29
End: 2019-03-29

## 2019-03-29 VITALS
HEIGHT: 61 IN | BODY MASS INDEX: 33.14 KG/M2 | DIASTOLIC BLOOD PRESSURE: 80 MMHG | SYSTOLIC BLOOD PRESSURE: 120 MMHG | WEIGHT: 175.5 LBS

## 2019-03-29 DIAGNOSIS — O99.212 OBESITY COMPLICATING PREGNANCY, SECOND TRIMESTER: ICD-10-CM

## 2019-03-29 DIAGNOSIS — Z87.59 PERSONAL HISTORY OF OTHER COMPLICATIONS OF PREGNANCY, CHILDBIRTH AND THE PUERPERIUM: ICD-10-CM

## 2019-03-29 DIAGNOSIS — Z83.3 FAMILY HISTORY OF DIABETES MELLITUS: ICD-10-CM

## 2019-03-29 DIAGNOSIS — Z3A.16 16 WEEKS GESTATION OF PREGNANCY: ICD-10-CM

## 2019-03-29 DIAGNOSIS — Z78.9 OTHER SPECIFIED HEALTH STATUS: ICD-10-CM

## 2019-03-29 DIAGNOSIS — Z82.49 FAMILY HISTORY OF ISCHEMIC HEART DISEASE AND OTHER DISEASES OF THE CIRCULATORY SYSTEM: ICD-10-CM

## 2019-03-29 DIAGNOSIS — Z87.51 PERSONAL HISTORY OF PRE-TERM LABOR: ICD-10-CM

## 2019-03-29 DIAGNOSIS — O09.211 SUPERVISION OF PREGNANCY WITH HISTORY OF PRE-TERM LABOR, FIRST TRIMESTER: ICD-10-CM

## 2019-03-29 PROCEDURE — 76805 OB US >/= 14 WKS SNGL FETUS: CPT

## 2019-03-29 PROCEDURE — 99215 OFFICE O/P EST HI 40 MIN: CPT | Mod: TH

## 2019-03-29 PROCEDURE — 76817 TRANSVAGINAL US OBSTETRIC: CPT

## 2019-03-29 RX ORDER — VITAMIN C, CALCIUM, IRON, VITAMIN D3, VITAMIN E, VITAMIN B1, VITAMIN B2, VITAMIN B3, VITAMIN B6, FOLIC ACID, IODINE, ZINC, COPPER, DOCUSATE SODIUM, DOCOSAHEXAENOIC ACID (DHA) 27-1-50 MG
KIT ORAL
Refills: 0 | Status: ACTIVE | COMMUNITY

## 2019-03-29 NOTE — OB HISTORY
[Pregnancy History] : patient received anesthesia [LMP: ___] : LMP: [unfilled] [JOSE L: ___] : JOSE L: [unfilled] [EGA: ___ wks] : EGA: [unfilled] wks [Spontaneous] : Spontaneous conception [Sonogram] : sonogram [at ___ wks] : at [unfilled] weeks [Definite:  ___ (Date)] : the last menstrual period was [unfilled] [Normal Amount/Duration] : was of a normal amount and duration [Regular Cycle Intervals] : periods have been regular [Frequency: Q ___ days] : menstrual periods occur approximately every [unfilled] days [Menarche Age: ____] : age at menarche was [unfilled] [___] : Living: [unfilled] [FreeTextEntry1] : Her prenatal records were not available for my review. [Spotting Between  Menses] : no spotting between menses [Menstrual Cramps] : no menstrual cramps [On BCP at conception] : the patient was not on BCP at conception

## 2019-03-29 NOTE — VITALS
[LMP (date): ___] : LMP was on [unfilled] [GA =___ Weeks] : which calculates to a GA of [unfilled] weeks [GA= ___ Days] : and [unfilled] day(s) [JOSE L by LMP (date): ___] : The calculated JOSE L by LMP is [unfilled] [By LMP] : this is the final JOSE L

## 2019-03-29 NOTE — FAMILY HISTORY
[Age 35+ During Pregnancy] : not 35 or over during pregnancy [Reported Family History Of Birth Defects] : no congenital heart defects [Jamal-Sachs Carrier] : no Jamal-Sachs [Family History] : no mental retardation/autism [Reported Family History Of Genetic Disease] : no history of child defect in child of baby father

## 2019-04-22 ENCOUNTER — APPOINTMENT (OUTPATIENT)
Dept: ANTEPARTUM | Facility: CLINIC | Age: 29
End: 2019-04-22
Payer: MEDICAID

## 2019-04-22 ENCOUNTER — ASOB RESULT (OUTPATIENT)
Age: 29
End: 2019-04-22

## 2019-04-22 PROCEDURE — 76817 TRANSVAGINAL US OBSTETRIC: CPT

## 2019-04-22 PROCEDURE — 76811 OB US DETAILED SNGL FETUS: CPT

## 2019-05-07 ENCOUNTER — APPOINTMENT (OUTPATIENT)
Age: 29
End: 2019-05-07
Payer: MEDICAID

## 2019-05-07 ENCOUNTER — ASOB RESULT (OUTPATIENT)
Age: 29
End: 2019-05-07

## 2019-05-07 PROCEDURE — 76816 OB US FOLLOW-UP PER FETUS: CPT

## 2019-07-02 ENCOUNTER — ASOB RESULT (OUTPATIENT)
Age: 29
End: 2019-07-02

## 2019-07-02 ENCOUNTER — APPOINTMENT (OUTPATIENT)
Age: 29
End: 2019-07-02
Payer: MEDICAID

## 2019-07-02 PROCEDURE — 76816 OB US FOLLOW-UP PER FETUS: CPT

## 2019-07-02 PROCEDURE — 76819 FETAL BIOPHYS PROFIL W/O NST: CPT

## 2019-07-18 ENCOUNTER — OUTPATIENT (OUTPATIENT)
Dept: INPATIENT UNIT | Facility: HOSPITAL | Age: 29
LOS: 1 days | End: 2019-07-18
Payer: COMMERCIAL

## 2019-07-18 VITALS
SYSTOLIC BLOOD PRESSURE: 118 MMHG | HEART RATE: 95 BPM | TEMPERATURE: 98 F | RESPIRATION RATE: 18 BRPM | DIASTOLIC BLOOD PRESSURE: 77 MMHG

## 2019-07-18 VITALS — SYSTOLIC BLOOD PRESSURE: 117 MMHG | DIASTOLIC BLOOD PRESSURE: 73 MMHG | HEART RATE: 100 BPM

## 2019-07-18 DIAGNOSIS — O47.03 FALSE LABOR BEFORE 37 COMPLETED WEEKS OF GESTATION, THIRD TRIMESTER: ICD-10-CM

## 2019-07-18 LAB
APPEARANCE UR: CLEAR — SIGNIFICANT CHANGE UP
BACTERIA # UR AUTO: ABNORMAL
BILIRUB UR-MCNC: NEGATIVE — SIGNIFICANT CHANGE UP
COLOR SPEC: YELLOW — SIGNIFICANT CHANGE UP
COMMENT - URINE: SIGNIFICANT CHANGE UP
DIFF PNL FLD: NEGATIVE — SIGNIFICANT CHANGE UP
EPI CELLS # UR: ABNORMAL
FIBRONECTIN FETAL SPEC QL: NEGATIVE — SIGNIFICANT CHANGE UP
GLUCOSE UR QL: NEGATIVE MG/DL — SIGNIFICANT CHANGE UP
KETONES UR-MCNC: NEGATIVE — SIGNIFICANT CHANGE UP
LEUKOCYTE ESTERASE UR-ACNC: ABNORMAL
NITRITE UR-MCNC: NEGATIVE — SIGNIFICANT CHANGE UP
PH UR: 7 — SIGNIFICANT CHANGE UP (ref 5–8)
PROT UR-MCNC: NEGATIVE MG/DL — SIGNIFICANT CHANGE UP
RBC CASTS # UR COMP ASSIST: NEGATIVE /HPF — SIGNIFICANT CHANGE UP (ref 0–4)
SP GR SPEC: 1.01 — SIGNIFICANT CHANGE UP (ref 1.01–1.02)
UROBILINOGEN FLD QL: NEGATIVE MG/DL — SIGNIFICANT CHANGE UP
WBC UR QL: ABNORMAL

## 2019-07-18 PROCEDURE — 87591 N.GONORRHOEAE DNA AMP PROB: CPT

## 2019-07-18 PROCEDURE — 87491 CHLMYD TRACH DNA AMP PROBE: CPT

## 2019-07-18 PROCEDURE — 87800 DETECT AGNT MULT DNA DIREC: CPT

## 2019-07-18 PROCEDURE — 36415 COLL VENOUS BLD VENIPUNCTURE: CPT

## 2019-07-18 PROCEDURE — 59025 FETAL NON-STRESS TEST: CPT

## 2019-07-18 PROCEDURE — 81001 URINALYSIS AUTO W/SCOPE: CPT

## 2019-07-18 PROCEDURE — 82731 ASSAY OF FETAL FIBRONECTIN: CPT

## 2019-07-18 PROCEDURE — G0463: CPT

## 2019-07-18 PROCEDURE — 76815 OB US LIMITED FETUS(S): CPT

## 2019-07-18 PROCEDURE — 87081 CULTURE SCREEN ONLY: CPT

## 2019-07-18 NOTE — OB PROVIDER TRIAGE NOTE - HISTORY OF PRESENT ILLNESS
Patient is a 27 yo  at 32 5/7 weeks EGA consistent with LMP with JOSE L of 19 who presents to the hospital c/o Ctx.    She states that she woke up this morning around 7 AM with pain in her lower abdomen that would peak and subside. She states that the pain has persisted throughout the day, and that it comes every 4-5 minutes. She says that it has not gotten worse or better, but remains about the same. She denies LOF, VB. She reports normal FM and a feeling of pressure "like I have to go to the bathroom". She denies recent strenuous activity, trauma to the belly, intercourse. She says that she has a history of  delivery of a live infant at 35 weeks after several episodes of Ctx and subsequent PPROM.     With exception of history of  birth, her current pregnancy has been uncomplicated.

## 2019-07-18 NOTE — OB PROVIDER TRIAGE NOTE - NSHPPHYSICALEXAM_GEN_ALL_CORE
Vital Signs Last 24 Hrs  T(C): 36.7 (18 Jul 2019 12:24), Max: 36.7 (18 Jul 2019 12:24)  T(F): 98 (18 Jul 2019 12:24), Max: 98 (18 Jul 2019 12:24)  HR: 95 (18 Jul 2019 12:31) (95 - 95)  BP: 118/77 (18 Jul 2019 12:31) (118/77 - 118/77)  RR: 18 (18 Jul 2019 12:24) (18 - 18)    Gen: AAOx3 in NAD  Heart: RRR without M/G/R  Lungs: CTAB without W/R/R  Abd: +bowel sounds, soft, nontender with gravid uterus  SVE: cervix closed/thick/high  SSE: no pooling, no bleeding, minimal leukorrhea, cervix closed  FHRT: baseline rate of 145, moderate-marked variability, +accels, no decels  [place]: patient isidro irregularly

## 2019-07-18 NOTE — OB PROVIDER TRIAGE NOTE - NSOBPROVIDERNOTE_OBGYN_ALL_OB_FT
Patient is a 29 yo  at 32 5/7 weeks EGA consistent with LMP with JOSE L of 19 who presents to the hospital c/o Ctx.    R/O  Labor  -Patient has history of prior  birth in .   -She is isidro irregularly on toco and admits to Ctxs since early this morning  -Category 1 FHRT. Baseline rate of 145, moderate variability, +accels, no decels  -Labs ordered: FFN, GC Chlamydia, GBS, UA; pending  -Will give IV fluids and await FFN results to re-evaluate.     d/w Dr. Simental Patient is a 29 yo  at 32 5/7 weeks EGA consistent with LMP with JOSE L of 19 who presents to the hospital c/o Ctx.    R/O  Labor  -Patient has history of prior  birth in .   -She is isidro irregularly on toco and admits to Ctxs since early this morning  -Category 1 FHRT. Baseline rate of 145, moderate variability, +accels, no decels  -Labs ordered: FFN, GC Chlamydia, GBS, UA; pending  -Will give IV fluids and await FFN results to re-evaluate.     d/w Dr. Simental    attending addendum  not in PTL  FFN neg  strong precautions provided  return to clinic in 1-2 weeks  ppalos

## 2019-07-18 NOTE — OB PROVIDER TRIAGE NOTE - NS_OBGYNHISTORY_OBGYN_ALL_OB_FT
Patient is .    2014:  of 1kvx80ml boy, at 35 weeks. PPROM/steroids/baby in NICU X 11 days, had jaundice and irregular heartbeat  ETOP: unknown date

## 2019-07-19 LAB
C TRACH RRNA SPEC QL NAA+PROBE: SIGNIFICANT CHANGE UP
CANDIDA AB TITR SER: SIGNIFICANT CHANGE UP
G VAGINALIS DNA SPEC QL NAA+PROBE: DETECTED
N GONORRHOEA RRNA SPEC QL NAA+PROBE: SIGNIFICANT CHANGE UP
SPECIMEN SOURCE: SIGNIFICANT CHANGE UP
T VAGINALIS SPEC QL WET PREP: SIGNIFICANT CHANGE UP

## 2019-07-20 LAB
CULTURE RESULTS: SIGNIFICANT CHANGE UP
SPECIMEN SOURCE: SIGNIFICANT CHANGE UP

## 2019-08-08 ENCOUNTER — OUTPATIENT (OUTPATIENT)
Dept: OUTPATIENT SERVICES | Facility: HOSPITAL | Age: 29
LOS: 1 days | End: 2019-08-08
Payer: COMMERCIAL

## 2019-08-08 VITALS
RESPIRATION RATE: 16 BRPM | HEART RATE: 90 BPM | TEMPERATURE: 98 F | SYSTOLIC BLOOD PRESSURE: 136 MMHG | DIASTOLIC BLOOD PRESSURE: 64 MMHG

## 2019-08-08 VITALS
TEMPERATURE: 98 F | HEART RATE: 90 BPM | DIASTOLIC BLOOD PRESSURE: 64 MMHG | SYSTOLIC BLOOD PRESSURE: 136 MMHG | RESPIRATION RATE: 16 BRPM

## 2019-08-08 DIAGNOSIS — O47.03 FALSE LABOR BEFORE 37 COMPLETED WEEKS OF GESTATION, THIRD TRIMESTER: ICD-10-CM

## 2019-08-08 PROCEDURE — 59050 FETAL MONITOR W/REPORT: CPT

## 2019-08-08 PROCEDURE — 59025 FETAL NON-STRESS TEST: CPT

## 2019-08-08 PROCEDURE — G0463: CPT

## 2019-08-08 RX ORDER — ACETAMINOPHEN 500 MG
650 TABLET ORAL ONCE
Refills: 0 | Status: COMPLETED | OUTPATIENT
Start: 2019-08-08 | End: 2019-08-08

## 2019-08-08 RX ADMIN — Medication 650 MILLIGRAM(S): at 23:33

## 2019-08-08 RX ADMIN — Medication 650 MILLIGRAM(S): at 23:15

## 2019-08-08 NOTE — OB PROVIDER TRIAGE NOTE - HISTORY OF PRESENT ILLNESS
29 yo  here at 35.5 wks GA by LMP confirmed by US for back pain and upper leg pain that has been going on for 1 week. She reports an episode of it getting worse tonight. She reports that the pain is worse with movement. + FM, no LOF/VB. Denies any complications during this pregnancy.       obhx: 1   at 35 wks GA  1 SAB    ICU Vital Signs Last 24 Hrs  T(C): 36.8 (08 Aug 2019 22:46), Max: 36.8 (08 Aug 2019 22:46)  T(F): 98.24 (08 Aug 2019 22:46), Max: 98.24 (08 Aug 2019 22:46)  HR: 90 (08 Aug 2019 22:46) (90 - 90)  BP: 136/64 (08 Aug 2019 22:46) (136/64 - 136/64)      Gen: uncomfortable appearing  Abd: soft, non-tender, non-distended, gravid uterus no contractions palpated.   SVE: closed/soft/ -3 no blood or abnormal discharge on digital exam.     FHT: 140/ mod jackson/+accels/ no decels  [place]: no contractions       29 yo  here at 35 wks with what sounds like round ligament pain. Will give tylenol PO and fluids. Encourage pt to change position to find more comfortable position. FHT reactive.       apolinar Tan

## 2019-08-09 PROBLEM — O03.9 COMPLETE OR UNSPECIFIED SPONTANEOUS ABORTION WITHOUT COMPLICATION: Chronic | Status: ACTIVE | Noted: 2019-07-18

## 2019-08-13 ENCOUNTER — ASOB RESULT (OUTPATIENT)
Age: 29
End: 2019-08-13

## 2019-08-13 ENCOUNTER — APPOINTMENT (OUTPATIENT)
Dept: ANTEPARTUM | Facility: CLINIC | Age: 29
End: 2019-08-13
Payer: MEDICAID

## 2019-08-13 PROCEDURE — 76819 FETAL BIOPHYS PROFIL W/O NST: CPT

## 2019-08-13 PROCEDURE — 76816 OB US FOLLOW-UP PER FETUS: CPT

## 2019-08-18 ENCOUNTER — OUTPATIENT (OUTPATIENT)
Dept: OUTPATIENT SERVICES | Facility: HOSPITAL | Age: 29
LOS: 1 days | End: 2019-08-18
Payer: COMMERCIAL

## 2019-08-18 VITALS — DIASTOLIC BLOOD PRESSURE: 91 MMHG | SYSTOLIC BLOOD PRESSURE: 152 MMHG | HEART RATE: 112 BPM

## 2019-08-18 VITALS — HEART RATE: 98 BPM | DIASTOLIC BLOOD PRESSURE: 74 MMHG | SYSTOLIC BLOOD PRESSURE: 133 MMHG

## 2019-08-18 DIAGNOSIS — O47.1 FALSE LABOR AT OR AFTER 37 COMPLETED WEEKS OF GESTATION: ICD-10-CM

## 2019-08-18 PROCEDURE — 99234 HOSP IP/OBS SM DT SF/LOW 45: CPT

## 2019-08-18 PROCEDURE — G0463: CPT

## 2019-08-18 PROCEDURE — 59025 FETAL NON-STRESS TEST: CPT

## 2019-08-18 NOTE — OB PROVIDER TRIAGE NOTE - NSHPPHYSICALEXAM_GEN_ALL_CORE
Vital Signs Last 24 Hrs  T(C): 37.1 (18 Aug 2019 02:41), Max: 37.1 (18 Aug 2019 02:41)  T(F): 98.8 (18 Aug 2019 02:41), Max: 98.8 (18 Aug 2019 02:41)  HR: 98 (18 Aug 2019 03:04) (98 - 112)  BP: 133/74 (18 Aug 2019 03:04) (127/68 - 152/91)  RR: 17 (18 Aug 2019 02:41) (17 - 17)    Abdomen: soft nontender   SSE: no pooling, amnioswab negative   SVE: 1/40/-3 posterior

## 2019-08-18 NOTE — OB PROVIDER TRIAGE NOTE - NSOBPROVIDERNOTE_OBGYN_ALL_OB_FT
27yo  at 37 1/7 weeks consistent with JOSE L 19 who presents to L&D with decreased fetal movement x 1 day.  -Patient feeling baby move now  -FHT reactive with accelerations and moderate variability no decelerations   -[place]: no ctx   -Ruled out for rupture and labor   -D/c patient home with precautions. Next appointment at [facility] on Monday.   Discussed plan with Dr. Tan

## 2019-08-18 NOTE — OB PROVIDER TRIAGE NOTE - HISTORY OF PRESENT ILLNESS
27yo  at 37 1/7 weeks consistent with JOSE L 19 who presents to L&D with decreased fetal movement x 1 day. No contractions. Had some vague leaking symptoms at office last Friday and was instructed to be evaluated at hospital for r/o ROM but she did not come. No vaginal bleeding. She is feeling baby move now   Prenatal course complicated by   1. Suspected macrosomia EFW 93%tile on , repeat sono on  to determine mode of delivery possible primary c/s for macrosomia if >5000g   OBHx   G1:  at 35 weeks   G2: TOP     PMSH: denies   ALLL: NKDA

## 2019-08-26 ENCOUNTER — OUTPATIENT (OUTPATIENT)
Dept: INPATIENT UNIT | Facility: HOSPITAL | Age: 29
LOS: 1 days | End: 2019-08-26
Payer: COMMERCIAL

## 2019-08-26 VITALS — DIASTOLIC BLOOD PRESSURE: 91 MMHG | SYSTOLIC BLOOD PRESSURE: 140 MMHG | HEART RATE: 121 BPM

## 2019-08-26 VITALS — TEMPERATURE: 98 F | RESPIRATION RATE: 18 BRPM

## 2019-08-26 DIAGNOSIS — O47.1 FALSE LABOR AT OR AFTER 37 COMPLETED WEEKS OF GESTATION: ICD-10-CM

## 2019-08-26 PROCEDURE — G0463: CPT

## 2019-08-26 PROCEDURE — 59025 FETAL NON-STRESS TEST: CPT

## 2019-08-26 RX ORDER — ACETAMINOPHEN 500 MG
650 TABLET ORAL ONCE
Refills: 0 | Status: COMPLETED | OUTPATIENT
Start: 2019-08-26 | End: 2019-08-26

## 2019-08-26 RX ADMIN — Medication 650 MILLIGRAM(S): at 14:09

## 2019-08-26 NOTE — OB PROVIDER TRIAGE NOTE - HISTORY OF PRESENT ILLNESS
Patient is a 30yo  at 38 weeks and 2 days dated by LMP 18 coming in with contractions from [facility] clinic. Denies LOF, VB. Patient reports having decreased FM. JOSE L 19.  Pregnancy complicated by: LGA    PMH: none  PSH: none  Meds: PNV  Allergies: none  OBhx: previous delivery @ 35w after ROM    FHT: reactive  [place]: cxts q 4-6 mins  VE: 3/50/-2    A/P: Patient is a 30yo  at 38 weeks and 2 days dated by LMP 18 coming in for r/o labor.  -Patient monitored for 2 hours  -Cervical exam unchanged in 2 hours, cxts irreg   -Patient not found to be in labor  -Offered patient admission for pain management but patient declined  -Advised to take tylenol OTC for pain, will give 1 dose here  -Patient has appt for US tomorrow to measure EFW; is now approximately 4000g  -Had an extensive conversation with patient and family, explained that patient is not in labor because there has not been any cervical change despite contractions. She declined admission for pain control. Advised patient to d/c home and return if ROM, VB, decreased FM or contractions become stronger.    D/W Dr. Calloway

## 2019-08-27 ENCOUNTER — APPOINTMENT (OUTPATIENT)
Dept: ANTEPARTUM | Facility: CLINIC | Age: 29
End: 2019-08-27
Payer: MEDICAID

## 2019-08-27 ENCOUNTER — ASOB RESULT (OUTPATIENT)
Age: 29
End: 2019-08-27

## 2019-08-27 PROCEDURE — 76819 FETAL BIOPHYS PROFIL W/O NST: CPT

## 2019-08-27 PROCEDURE — 76816 OB US FOLLOW-UP PER FETUS: CPT

## 2019-08-28 ENCOUNTER — OUTPATIENT (OUTPATIENT)
Dept: INPATIENT UNIT | Facility: HOSPITAL | Age: 29
LOS: 1 days | End: 2019-08-28
Payer: COMMERCIAL

## 2019-08-28 VITALS — DIASTOLIC BLOOD PRESSURE: 85 MMHG | TEMPERATURE: 98 F | SYSTOLIC BLOOD PRESSURE: 124 MMHG | HEART RATE: 99 BPM

## 2019-08-28 VITALS
DIASTOLIC BLOOD PRESSURE: 88 MMHG | SYSTOLIC BLOOD PRESSURE: 148 MMHG | HEART RATE: 123 BPM | TEMPERATURE: 99 F | RESPIRATION RATE: 16 BRPM

## 2019-08-28 DIAGNOSIS — O47.1 FALSE LABOR AT OR AFTER 37 COMPLETED WEEKS OF GESTATION: ICD-10-CM

## 2019-08-28 PROCEDURE — G0463: CPT

## 2019-08-28 PROCEDURE — 59025 FETAL NON-STRESS TEST: CPT

## 2019-08-28 NOTE — OB PROVIDER TRIAGE NOTE - HISTORY OF PRESENT ILLNESS
ALEXKRZYSZTOF is a 29yF  @38w4d Gestational Age by LMP. She presents to L&D for contractions spaced minutes apart. She was seen 2 days prior for similar complaints with exam 3/50/-3  GBS +    Ctx: Endorses - regular - Starting since monday  Mvmt: +   LOF: Denies  Vaginal bleeding: Denies     PMH: No pertinent PMH  PSH: None   Past OB/GYN Hx:   -    at 35 weeks after ROM  - SAB dates unknown    Meds: PNV  All: NKDA ALEXKRZYSZTOF is a 29yF  @38w4d Gestational Age by LMP.   She presents to L&D for contractions spaced minutes apart. She was seen 2 days prior for similar complaints with exam 3/50/-3      Ctx: Endorses - regular - Starting since monday  Mvmt: +   LOF: Denies  Vaginal bleeding: Denies     PMH: No pertinent PMH  PSH: None   Past OB/GYN Hx:   -    at 35 weeks after ROM  - SAB dates unknown    Meds: PNV  All: NKDA

## 2019-08-28 NOTE — OB PROVIDER TRIAGE NOTE - NSOBPROVIDERNOTE_OBGYN_ALL_OB_FT
KRZYSZTOF JOHNSON is a 29yF  @38w4d Gestational Age by LMP. She presents to L&D for contractions spaced minutes apart, GBS +. No cervical change from exam 2 days prior    Plan:  - Monitor FHT/toco.  - Encourage ambulation  - Re-examine for cervical change in one hour ALEX KRZYSZTOF is a 29yF  @38w4d Gestational Age by LMP. She presents to L&D for increasing frequency of contractions; r/o labor.      Plan:  - Reactive tracing  - Patient ambulated around L&D for an hour; No cervical change from exam 2 days prior  - Patient to be discharged home; encouraged continued ambulation, and hydrations  - Patient encouraged to return to L&D if presenting with decreased fetal movements, vaginal bleeding, increasing frequency of contractions or rupture of membranes    Plan d/w Dr. Tan

## 2019-08-28 NOTE — OB PROVIDER TRIAGE NOTE - NSHPPHYSICALEXAM_GEN_ALL_CORE
PHYSICAL EXAM  Vital Signs Last 24 Hrs  T(C): 37 (28 Aug 2019 01:06), Max: 37 (28 Aug 2019 01:06)  T(F): 98.6 (28 Aug 2019 01:06), Max: 98.6 (28 Aug 2019 01:06)  HR: 123 (28 Aug 2019 01:10) (123 - 123)  BP: 148/88 (28 Aug 2019 01:10) (148/88 - 148/88)  BP(mean): --  RR: 16 (28 Aug 2019 01:06) (16 - 16)      Gen: In apparent discomfort and pain  Abdomen: Soft, NT/ND, BS+  Extremities: Trace LE edema bilaterally  Cervical Exam: 30/50/-3    FHR:   - Baseline 150  - Patient taken off monitor to ambulate    US  - Cephalic position  - Anterior placenta    Labs PHYSICAL EXAM  Vital Signs Last 24 Hrs  T(C): 37 (28 Aug 2019 01:06), Max: 37 (28 Aug 2019 01:06)  T(F): 98.6 (28 Aug 2019 01:06), Max: 98.6 (28 Aug 2019 01:06)  HR: 123 (28 Aug 2019 01:10) (123 - 123)  BP: 148/88 (28 Aug 2019 01:10) (148/88 - 148/88)  RR: 16 (28 Aug 2019 01:06) (16 - 16)      Gen: Mild discomfort secondary to contractions  Abdomen: Soft, NT/ND, BS+  Extremities: Trace LE edema bilaterally  Cervical Exam: 3/50/-3    FHR:   - 145bpm, moderate variability,+accels, no decelerations present  TOCO: Irregular ctx every 5min    US  - Cephalic position  - Anterior placenta

## 2019-08-29 ENCOUNTER — INPATIENT (INPATIENT)
Facility: HOSPITAL | Age: 29
LOS: 2 days | Discharge: HOME | End: 2019-09-01
Attending: OBSTETRICS & GYNECOLOGY | Admitting: OBSTETRICS & GYNECOLOGY
Payer: COMMERCIAL

## 2019-08-29 VITALS
HEIGHT: 61 IN | WEIGHT: 199.96 LBS | RESPIRATION RATE: 18 BRPM | DIASTOLIC BLOOD PRESSURE: 86 MMHG | HEART RATE: 102 BPM | SYSTOLIC BLOOD PRESSURE: 133 MMHG | TEMPERATURE: 98 F

## 2019-08-29 DIAGNOSIS — O26.893 OTHER SPECIFIED PREGNANCY RELATED CONDITIONS, THIRD TRIMESTER: ICD-10-CM

## 2019-08-29 LAB
BASOPHILS # BLD AUTO: 0.04 K/UL — SIGNIFICANT CHANGE UP (ref 0–0.2)
BASOPHILS NFR BLD AUTO: 0.3 % — SIGNIFICANT CHANGE UP (ref 0–2)
BLD GP AB SCN SERPL QL: SIGNIFICANT CHANGE UP
EOSINOPHIL # BLD AUTO: 0.12 K/UL — SIGNIFICANT CHANGE UP (ref 0–0.5)
EOSINOPHIL NFR BLD AUTO: 1 % — SIGNIFICANT CHANGE UP (ref 0–6)
HCT VFR BLD CALC: 40.4 % — SIGNIFICANT CHANGE UP (ref 34.5–45)
HGB BLD-MCNC: 12.8 G/DL — SIGNIFICANT CHANGE UP (ref 11.5–15.5)
IMM GRANULOCYTES NFR BLD AUTO: 1.1 % — SIGNIFICANT CHANGE UP (ref 0–1.5)
LYMPHOCYTES # BLD AUTO: 2.69 K/UL — SIGNIFICANT CHANGE UP (ref 1–3.3)
LYMPHOCYTES # BLD AUTO: 22 % — SIGNIFICANT CHANGE UP (ref 13–44)
MCHC RBC-ENTMCNC: 25.5 PG — LOW (ref 27–34)
MCHC RBC-ENTMCNC: 31.7 GM/DL — LOW (ref 32–36)
MCV RBC AUTO: 80.6 FL — SIGNIFICANT CHANGE UP (ref 80–100)
MONOCYTES # BLD AUTO: 0.69 K/UL — SIGNIFICANT CHANGE UP (ref 0–0.9)
MONOCYTES NFR BLD AUTO: 5.7 % — SIGNIFICANT CHANGE UP (ref 2–14)
NEUTROPHILS # BLD AUTO: 8.53 K/UL — HIGH (ref 1.8–7.4)
NEUTROPHILS NFR BLD AUTO: 69.9 % — SIGNIFICANT CHANGE UP (ref 43–77)
PLATELET # BLD AUTO: 243 K/UL — SIGNIFICANT CHANGE UP (ref 150–400)
RBC # BLD: 5.01 M/UL — SIGNIFICANT CHANGE UP (ref 3.8–5.2)
RBC # FLD: 15.1 % — HIGH (ref 10.3–14.5)
WBC # BLD: 12.2 K/UL — HIGH (ref 3.8–10.5)
WBC # FLD AUTO: 12.2 K/UL — HIGH (ref 3.8–10.5)

## 2019-08-29 RX ORDER — SODIUM CHLORIDE 9 MG/ML
1000 INJECTION, SOLUTION INTRAVENOUS
Refills: 0 | Status: DISCONTINUED | OUTPATIENT
Start: 2019-08-29 | End: 2019-08-30

## 2019-08-29 RX ORDER — OXYTOCIN 10 UNIT/ML
2 VIAL (ML) INJECTION
Qty: 30 | Refills: 0 | Status: DISCONTINUED | OUTPATIENT
Start: 2019-08-29 | End: 2019-09-01

## 2019-08-29 RX ORDER — AMPICILLIN TRIHYDRATE 250 MG
2 CAPSULE ORAL ONCE
Refills: 0 | Status: COMPLETED | OUTPATIENT
Start: 2019-08-29 | End: 2019-08-29

## 2019-08-29 RX ORDER — CITRIC ACID/SODIUM CITRATE 300-500 MG
30 SOLUTION, ORAL ORAL ONCE
Refills: 0 | Status: DISCONTINUED | OUTPATIENT
Start: 2019-08-29 | End: 2019-08-30

## 2019-08-29 RX ORDER — OXYTOCIN 10 UNIT/ML
333.33 VIAL (ML) INJECTION
Qty: 20 | Refills: 0 | Status: COMPLETED | OUTPATIENT
Start: 2019-08-29 | End: 2019-08-29

## 2019-08-29 RX ORDER — AMPICILLIN TRIHYDRATE 250 MG
1 CAPSULE ORAL EVERY 4 HOURS
Refills: 0 | Status: DISCONTINUED | OUTPATIENT
Start: 2019-08-29 | End: 2019-08-30

## 2019-08-29 RX ADMIN — Medication 2 MILLIUNIT(S)/MIN: at 21:57

## 2019-08-29 RX ADMIN — Medication 108 GRAM(S): at 23:15

## 2019-08-29 RX ADMIN — SODIUM CHLORIDE 125 MILLILITER(S): 9 INJECTION, SOLUTION INTRAVENOUS at 19:56

## 2019-08-29 RX ADMIN — Medication 216 GRAM(S): at 19:00

## 2019-08-30 ENCOUNTER — TRANSCRIPTION ENCOUNTER (OUTPATIENT)
Age: 29
End: 2019-08-30

## 2019-08-30 LAB
HCT VFR BLD CALC: 30.8 % — LOW (ref 34.5–45)
HGB BLD-MCNC: 9.8 G/DL — LOW (ref 11.5–15.5)

## 2019-08-30 RX ORDER — BENZOCAINE 10 %
1 GEL (GRAM) MUCOUS MEMBRANE EVERY 6 HOURS
Refills: 0 | Status: DISCONTINUED | OUTPATIENT
Start: 2019-08-30 | End: 2019-09-01

## 2019-08-30 RX ORDER — DOCUSATE SODIUM 100 MG
100 CAPSULE ORAL
Refills: 0 | Status: DISCONTINUED | OUTPATIENT
Start: 2019-08-30 | End: 2019-09-01

## 2019-08-30 RX ORDER — OXYCODONE HYDROCHLORIDE 5 MG/1
5 TABLET ORAL ONCE
Refills: 0 | Status: DISCONTINUED | OUTPATIENT
Start: 2019-08-30 | End: 2019-09-01

## 2019-08-30 RX ORDER — IBUPROFEN 200 MG
600 TABLET ORAL EVERY 6 HOURS
Refills: 0 | Status: COMPLETED | OUTPATIENT
Start: 2019-08-30 | End: 2020-07-28

## 2019-08-30 RX ORDER — MAGNESIUM HYDROXIDE 400 MG/1
30 TABLET, CHEWABLE ORAL
Refills: 0 | Status: DISCONTINUED | OUTPATIENT
Start: 2019-08-30 | End: 2019-09-01

## 2019-08-30 RX ORDER — PRAMOXINE HYDROCHLORIDE 150 MG/15G
1 AEROSOL, FOAM RECTAL EVERY 4 HOURS
Refills: 0 | Status: DISCONTINUED | OUTPATIENT
Start: 2019-08-30 | End: 2019-09-01

## 2019-08-30 RX ORDER — SIMETHICONE 80 MG/1
80 TABLET, CHEWABLE ORAL EVERY 4 HOURS
Refills: 0 | Status: DISCONTINUED | OUTPATIENT
Start: 2019-08-30 | End: 2019-09-01

## 2019-08-30 RX ORDER — OXYCODONE HYDROCHLORIDE 5 MG/1
5 TABLET ORAL
Refills: 0 | Status: DISCONTINUED | OUTPATIENT
Start: 2019-08-30 | End: 2019-09-01

## 2019-08-30 RX ORDER — LANOLIN
1 OINTMENT (GRAM) TOPICAL EVERY 6 HOURS
Refills: 0 | Status: DISCONTINUED | OUTPATIENT
Start: 2019-08-30 | End: 2019-09-01

## 2019-08-30 RX ORDER — HYDROCORTISONE 1 %
1 OINTMENT (GRAM) TOPICAL EVERY 6 HOURS
Refills: 0 | Status: DISCONTINUED | OUTPATIENT
Start: 2019-08-30 | End: 2019-09-01

## 2019-08-30 RX ORDER — IBUPROFEN 200 MG
600 TABLET ORAL EVERY 6 HOURS
Refills: 0 | Status: DISCONTINUED | OUTPATIENT
Start: 2019-08-30 | End: 2019-09-01

## 2019-08-30 RX ORDER — DIBUCAINE 1 %
1 OINTMENT (GRAM) RECTAL EVERY 6 HOURS
Refills: 0 | Status: DISCONTINUED | OUTPATIENT
Start: 2019-08-30 | End: 2019-09-01

## 2019-08-30 RX ORDER — OXYTOCIN 10 UNIT/ML
333.33 VIAL (ML) INJECTION
Qty: 20 | Refills: 0 | Status: DISCONTINUED | OUTPATIENT
Start: 2019-08-30 | End: 2019-09-01

## 2019-08-30 RX ORDER — SODIUM CHLORIDE 9 MG/ML
3 INJECTION INTRAMUSCULAR; INTRAVENOUS; SUBCUTANEOUS EVERY 8 HOURS
Refills: 0 | Status: DISCONTINUED | OUTPATIENT
Start: 2019-08-30 | End: 2019-09-01

## 2019-08-30 RX ORDER — TETANUS TOXOID, REDUCED DIPHTHERIA TOXOID AND ACELLULAR PERTUSSIS VACCINE, ADSORBED 5; 2.5; 8; 8; 2.5 [IU]/.5ML; [IU]/.5ML; UG/.5ML; UG/.5ML; UG/.5ML
0.5 SUSPENSION INTRAMUSCULAR ONCE
Refills: 0 | Status: DISCONTINUED | OUTPATIENT
Start: 2019-08-30 | End: 2019-09-01

## 2019-08-30 RX ORDER — DIPHENHYDRAMINE HCL 50 MG
25 CAPSULE ORAL EVERY 6 HOURS
Refills: 0 | Status: DISCONTINUED | OUTPATIENT
Start: 2019-08-30 | End: 2019-09-01

## 2019-08-30 RX ORDER — GLYCERIN ADULT
1 SUPPOSITORY, RECTAL RECTAL AT BEDTIME
Refills: 0 | Status: DISCONTINUED | OUTPATIENT
Start: 2019-08-30 | End: 2019-09-01

## 2019-08-30 RX ORDER — ACETAMINOPHEN 500 MG
975 TABLET ORAL
Refills: 0 | Status: DISCONTINUED | OUTPATIENT
Start: 2019-08-30 | End: 2019-09-01

## 2019-08-30 RX ORDER — KETOROLAC TROMETHAMINE 30 MG/ML
30 SYRINGE (ML) INJECTION ONCE
Refills: 0 | Status: DISCONTINUED | OUTPATIENT
Start: 2019-08-30 | End: 2019-08-30

## 2019-08-30 RX ORDER — AER TRAVELER 0.5 G/1
1 SOLUTION RECTAL; TOPICAL EVERY 4 HOURS
Refills: 0 | Status: DISCONTINUED | OUTPATIENT
Start: 2019-08-30 | End: 2019-09-01

## 2019-08-30 RX ADMIN — Medication 30 MILLIGRAM(S): at 04:20

## 2019-08-30 RX ADMIN — Medication 30 MILLIGRAM(S): at 03:58

## 2019-08-30 RX ADMIN — OXYCODONE HYDROCHLORIDE 5 MILLIGRAM(S): 5 TABLET ORAL at 11:28

## 2019-08-30 RX ADMIN — OXYCODONE HYDROCHLORIDE 5 MILLIGRAM(S): 5 TABLET ORAL at 12:28

## 2019-08-30 RX ADMIN — Medication 975 MILLIGRAM(S): at 08:45

## 2019-08-30 RX ADMIN — Medication 975 MILLIGRAM(S): at 07:45

## 2019-08-30 RX ADMIN — Medication 1000 MILLIUNIT(S)/MIN: at 04:07

## 2019-08-30 RX ADMIN — Medication 975 MILLIGRAM(S): at 16:17

## 2019-08-30 RX ADMIN — Medication 600 MILLIGRAM(S): at 14:28

## 2019-08-30 RX ADMIN — Medication 975 MILLIGRAM(S): at 21:57

## 2019-08-30 RX ADMIN — Medication 600 MILLIGRAM(S): at 15:28

## 2019-08-30 RX ADMIN — Medication 100 MILLIGRAM(S): at 11:28

## 2019-08-30 RX ADMIN — SODIUM CHLORIDE 3 MILLILITER(S): 9 INJECTION INTRAMUSCULAR; INTRAVENOUS; SUBCUTANEOUS at 06:22

## 2019-08-30 RX ADMIN — Medication 1 TABLET(S): at 11:28

## 2019-08-30 RX ADMIN — Medication 975 MILLIGRAM(S): at 16:32

## 2019-08-30 RX ADMIN — Medication 975 MILLIGRAM(S): at 22:40

## 2019-08-30 NOTE — OB PROVIDER DELIVERY SUMMARY - NSPROVIDERDELIVERYNOTE_OBGYN_ALL_OB_FT
Procedure: Normal vaginal delivery   Findings: Viable female infant delivered in cephalic presentation at 02:52, placenta delivered at 02:55. Apgar 9/9. Weight pending skin to skin, nuchal cord x1.  Laceration: 2nd degree perineal  Repair: 3-0 Chromic on CT  EBL: 250cc      Patient felt rectal pressure and was found to be fully dilated, 0 station. She pushed effectively for 15 minutes. In conjunction with maternal effort, she delivered a viable female infant . Vertex delivered without difficulty, Nuchal cord x1 noted, Anterior shoulders then delivered without difficulty. Placenta delivered spontaneously and intact. Pitocin started.  Excellent hemostasis was achieved. Perineum and vagina were inspected and a second degree perineal laceration and left labial laceration were noted and repaired with 3-0 chromic on SH/CT.  weight is pending skin to skin, apgars 9/9, EBL 250cc.

## 2019-08-30 NOTE — OB PROVIDER H&P - HISTORY OF PRESENT ILLNESS
28 yo [id number] here at 38.6 wks GA presented with contractions. + FM. Prenatal course complicated by elevated 1 hr GCT.     GBS+    PMH: No pertinent PMH  PSH: None   Past OB/GYN Hx:   -    at 35 weeks after ROM  - SAB dates unknown    Meds: PNV  All: NKDA

## 2019-08-30 NOTE — DISCHARGE NOTE OB - CARE PLAN
Principal Discharge DX:	 (normal spontaneous vaginal delivery)  Goal:	rapid recovery  Assessment and plan of treatment:	Patient can transition to regular activity level and continue regular diet. Patient should follow up with [facility] to schedule post partum visit. Patient should contact doctor earlier should she develop persistent/increased vaginal bleeding or fever.

## 2019-08-30 NOTE — DISCHARGE NOTE OB - MATERIALS PROVIDED
Shaken Baby Prevention Handout/Breastfeeding Guide and Packet/Birth Certificate Instructions/Prescription for Breast Pump/MMR Vaccination (VIS Pub Date: 2012)/Tdap Vaccination (VIS Pub Date: 2012)/Vaccinations/  Immunization Record/Breastfeeding Mother’s Support Group Information/Guide to Postpartum Care/[facility] Hearing Screen Program/Back To Sleep Handout/[facility]  Screening Program/Discharge Medication Information for Patients and Families Pocket Guide/Breastfeeding Log

## 2019-08-30 NOTE — OB RN DELIVERY SUMMARY - NS_SEPSISRSKCALC_OBGYN_ALL_OB_FT
No temperature has been documented for this patient in CPN or on the OB Flowsheet. Ensure the highest temperature during labor was documented on the OB Flowsheet.  No gestational age at birth has been documented. Ensure delivery date/time has been entered above.  Rupture of membranes must be entered above. EOS calculated successfully. EOS Risk Factor: 0.5/1000 live births ([facility] national incidence); GA=38w6d; Temp=98.24; ROM=2.5; GBS='Positive'; Antibiotics='GBS specific antibiotics > 2 hrs prior to birth'

## 2019-08-30 NOTE — DISCHARGE NOTE OB - MEDICATION SUMMARY - MEDICATIONS TO TAKE
I will START or STAY ON the medications listed below when I get home from the hospital:    ibuprofen 600 mg oral tablet  -- 1 tab(s) by mouth every 6 hours, As Needed -for moderate pain - for mild pain   -- Indication: For moderate pain

## 2019-08-30 NOTE — OB PROVIDER H&P - ASSESSMENT
28 yo  here at 38 w 6 d here for labor.   - admit  - continuous monitoring  - ampicillin for GBS +  - consent  - routine labs

## 2019-08-30 NOTE — DISCHARGE NOTE OB - PLAN OF CARE
rapid recovery Patient can transition to regular activity level and continue regular diet. Patient should follow up with [facility] to schedule post partum visit. Patient should contact doctor earlier should she develop persistent/increased vaginal bleeding or fever.

## 2019-08-30 NOTE — OB RN DELIVERY SUMMARY - APGAR COMPLETED BY
Agree with triage note. Pt to B16. Pt states n/v. No bleeding. , first pregnancy was an . Pt asking for ultrasound   Nurse/BLAYNE Clarke BLAYNE Clarke/Nurse

## 2019-08-30 NOTE — DISCHARGE NOTE OB - CARE PROVIDER_API CALL
Bulmaro Calloway)  Obstetrics and Gynecology  [address] [place] [address]  Phone: [phone]  Fax: [phone]  Follow Up Time:

## 2019-08-30 NOTE — DISCHARGE NOTE OB - PATIENT PORTAL LINK FT
You can access the FollowMyHealth Patient Portal offered by [facility] by registering at the following website: [email or web address]. By joining [employer]’s FollowMyHealth portal, you will also be able to view your health information using other applications (apps) compatible with our system.

## 2019-08-31 LAB
MEV IGG SER-ACNC: 55.7 AU/ML — SIGNIFICANT CHANGE UP
MEV IGG+IGM SER-IMP: POSITIVE — SIGNIFICANT CHANGE UP
T PALLIDUM AB TITR SER: NEGATIVE — SIGNIFICANT CHANGE UP

## 2019-08-31 RX ORDER — IBUPROFEN 200 MG
1 TABLET ORAL
Qty: 30 | Refills: 0
Start: 2019-08-31

## 2019-08-31 RX ADMIN — Medication 975 MILLIGRAM(S): at 09:10

## 2019-08-31 RX ADMIN — Medication 600 MILLIGRAM(S): at 01:05

## 2019-08-31 RX ADMIN — Medication 975 MILLIGRAM(S): at 14:27

## 2019-08-31 RX ADMIN — Medication 1 TABLET(S): at 12:07

## 2019-08-31 RX ADMIN — Medication 600 MILLIGRAM(S): at 13:00

## 2019-08-31 RX ADMIN — Medication 975 MILLIGRAM(S): at 21:04

## 2019-08-31 RX ADMIN — Medication 975 MILLIGRAM(S): at 21:06

## 2019-08-31 RX ADMIN — Medication 975 MILLIGRAM(S): at 15:16

## 2019-08-31 RX ADMIN — Medication 600 MILLIGRAM(S): at 12:08

## 2019-08-31 RX ADMIN — Medication 600 MILLIGRAM(S): at 07:01

## 2019-08-31 RX ADMIN — Medication 975 MILLIGRAM(S): at 04:00

## 2019-08-31 RX ADMIN — Medication 600 MILLIGRAM(S): at 06:17

## 2019-08-31 RX ADMIN — Medication 975 MILLIGRAM(S): at 10:00

## 2019-08-31 RX ADMIN — Medication 975 MILLIGRAM(S): at 03:06

## 2019-08-31 RX ADMIN — Medication 600 MILLIGRAM(S): at 00:15

## 2019-08-31 RX ADMIN — Medication 600 MILLIGRAM(S): at 18:25

## 2019-08-31 RX ADMIN — Medication 600 MILLIGRAM(S): at 17:37

## 2019-08-31 NOTE — PROGRESS NOTE ADULT - SUBJECTIVE AND OBJECTIVE BOX
29y  s/p  at 38+5wks gestation PPD#1.   Patient seen and examined at bedside, no acute overnight events.   Patient is ambulating, +eating, +PO hydration, +voiding, -Flatus, -BM, +Breast feeding and pain is well controlled.  Denies headache, SOB, fever, chills and calf pain.    VS:   Vital Signs Last 24 Hrs  T(C): 36.8 (30 Aug 2019 21:24), Max: 37 (30 Aug 2019 09:32)  T(F): 98.3 (30 Aug 2019 21:24), Max: 98.6 (30 Aug 2019 09:32)  HR: 84 (30 Aug 2019 21:24) (83 - 84)  BP: 124/76 (30 Aug 2019 21:24) (124/76 - 136/82)  BP(mean): --  RR: 19 (30 Aug 2019 21:24) (18 - 19)  SpO2: 97% (30 Aug 2019 21:24) (96% - 97%)    Physical Exam:  General: NAD  Abdomen: +BS, soft, ND, minimally tender, Fundus firm at level of umbilicus.   Pelvic: Minimal lochia  Ext: No cyanosis, edema or calf tenderness.     Labs:                        9.8    x     )-----------( x        ( 30 Aug 2019 09:22 )             30.8         Medication:  MEDICATIONS  (STANDING):  acetaminophen   Tablet .. 975 milliGRAM(s) Oral <User Schedule>  diphtheria/tetanus/pertussis (acellular) Vaccine (ADAcel) 0.5 milliLiter(s) IntraMuscular once  ibuprofen  Tablet. 600 milliGRAM(s) Oral every 6 hours  oxytocin Infusion 333.333 milliUNIT(s)/Min (1000 mL/Hr) IV Continuous <Continuous>  oxytocin Infusion 2 milliUNIT(s)/Min (2 mL/Hr) IV Continuous <Continuous>  prenatal multivitamin 1 Tablet(s) Oral daily  sodium chloride 0.9% lock flush 3 milliLiter(s) IV Push every 8 hours    MEDICATIONS  (PRN):  benzocaine 20%/menthol 0.5% Spray 1 Spray(s) Topical every 6 hours PRN for Perineal discomfort  dibucaine 1% Ointment 1 Application(s) Topical every 6 hours PRN Perineal discomfort  diphenhydrAMINE 25 milliGRAM(s) Oral every 6 hours PRN Pruritus  docusate sodium 100 milliGRAM(s) Oral two times a day PRN For stool softening  glycerin Suppository - Adult 1 Suppository(s) Rectal at bedtime PRN Constipation  hydrocortisone 1% Cream 1 Application(s) Topical every 6 hours PRN Moderate Pain (4-6)  lanolin Ointment 1 Application(s) Topical every 6 hours PRN nipple soreness  magnesium hydroxide Suspension 30 milliLiter(s) Oral two times a day PRN Constipation  oxyCODONE    IR 5 milliGRAM(s) Oral every 3 hours PRN Moderate to Severe Pain (4-10)  oxyCODONE    IR 5 milliGRAM(s) Oral once PRN Moderate to Severe Pain (4-10)  pramoxine 1%/zinc 5% Cream 1 Application(s) Topical every 4 hours PRN Moderate Pain (4-6)  simethicone 80 milliGRAM(s) Chew every 4 hours PRN Gas  witch hazel Pads 1 Application(s) Topical every 4 hours PRN Perineal discomfort

## 2019-08-31 NOTE — PROGRESS NOTE ADULT - ASSESSMENT
29y  s/p  at 38+5wks gestation PPD#1.     C/w postpartum care  Encourage ambulation & hydration  Encourage breastfeeding, mother/baby interaction  Constipatin: PRN meds on board, cont to monitor  Pain management PRN  Plan for discharge on PPD 2 unless otherwise specified  PPD prophylaxis: early ambulation

## 2019-09-01 VITALS
DIASTOLIC BLOOD PRESSURE: 80 MMHG | SYSTOLIC BLOOD PRESSURE: 136 MMHG | RESPIRATION RATE: 18 BRPM | TEMPERATURE: 98 F | HEART RATE: 80 BPM | OXYGEN SATURATION: 82 %

## 2019-09-01 PROCEDURE — 86765 RUBEOLA ANTIBODY: CPT

## 2019-09-01 PROCEDURE — 86850 RBC ANTIBODY SCREEN: CPT

## 2019-09-01 PROCEDURE — 59025 FETAL NON-STRESS TEST: CPT

## 2019-09-01 PROCEDURE — 86900 BLOOD TYPING SEROLOGIC ABO: CPT

## 2019-09-01 PROCEDURE — 86901 BLOOD TYPING SEROLOGIC RH(D): CPT

## 2019-09-01 PROCEDURE — G0463: CPT

## 2019-09-01 PROCEDURE — 36415 COLL VENOUS BLD VENIPUNCTURE: CPT

## 2019-09-01 PROCEDURE — 86780 TREPONEMA PALLIDUM: CPT

## 2019-09-01 PROCEDURE — 85018 HEMOGLOBIN: CPT

## 2019-09-01 PROCEDURE — 59050 FETAL MONITOR W/REPORT: CPT

## 2019-09-01 PROCEDURE — 85014 HEMATOCRIT: CPT

## 2019-09-01 PROCEDURE — 85027 COMPLETE CBC AUTOMATED: CPT

## 2019-09-01 RX ADMIN — Medication 975 MILLIGRAM(S): at 02:37

## 2019-09-01 RX ADMIN — Medication 975 MILLIGRAM(S): at 09:18

## 2019-09-01 RX ADMIN — OXYCODONE HYDROCHLORIDE 5 MILLIGRAM(S): 5 TABLET ORAL at 05:25

## 2019-09-01 RX ADMIN — Medication 975 MILLIGRAM(S): at 03:36

## 2019-09-01 RX ADMIN — Medication 975 MILLIGRAM(S): at 10:15

## 2019-09-01 NOTE — PROGRESS NOTE ADULT - SUBJECTIVE AND OBJECTIVE BOX
S: Patient doing well. Minimal lochia. No complaints.    O: Vital Signs Last 24 Hrs  T(C): 36.8 (01 Sep 2019 04:45), Max: 36.8 (01 Sep 2019 04:45)  T(F): 98.3 (01 Sep 2019 04:45), Max: 98.3 (01 Sep 2019 04:45)  HR: 70 (01 Sep 2019 04:45) (58 - 81)  BP: 114/67 (01 Sep 2019 04:45) (114/67 - 118/72)  BP(mean): --  RR: 18 (01 Sep 2019 04:45) (18 - 19)  SpO2: 96% (01 Sep 2019 04:45) (96% - 99%)    Gen: NAD  Abd: soft, NT, ND, fundus firm below umbilicus  Ext: no tenderness    Labs:                        9.8    x     )-----------( x        ( 30 Aug 2019 09:22 )             30.8          A/P PP # 2  Doing well.  Routine post partum care.  Discharge home today in satisfactorycondition.

## 2019-09-04 ENCOUNTER — APPOINTMENT (OUTPATIENT)
Dept: ANTEPARTUM | Facility: CLINIC | Age: 29
End: 2019-09-04

## 2019-09-21 ENCOUNTER — EMERGENCY (EMERGENCY)
Facility: HOSPITAL | Age: 29
LOS: 1 days | Discharge: HOME | End: 2019-09-21
Attending: EMERGENCY MEDICINE
Payer: COMMERCIAL

## 2019-09-21 VITALS
RESPIRATION RATE: 20 BRPM | DIASTOLIC BLOOD PRESSURE: 72 MMHG | HEART RATE: 136 BPM | HEIGHT: 63 IN | SYSTOLIC BLOOD PRESSURE: 108 MMHG | WEIGHT: 149.91 LBS | TEMPERATURE: 102 F | OXYGEN SATURATION: 97 %

## 2019-09-21 LAB — LACTATE BLDV-MCNC: 1.6 MMOL/L — SIGNIFICANT CHANGE UP (ref 0.5–2)

## 2019-09-21 PROCEDURE — 99284 EMERGENCY DEPT VISIT MOD MDM: CPT

## 2019-09-21 RX ORDER — SODIUM CHLORIDE 9 MG/ML
2100 INJECTION INTRAMUSCULAR; INTRAVENOUS; SUBCUTANEOUS ONCE
Refills: 0 | Status: COMPLETED | OUTPATIENT
Start: 2019-09-21 | End: 2019-09-21

## 2019-09-21 RX ORDER — ACETAMINOPHEN 500 MG
975 TABLET ORAL ONCE
Refills: 0 | Status: COMPLETED | OUTPATIENT
Start: 2019-09-21 | End: 2019-09-21

## 2019-09-21 RX ORDER — CEFTRIAXONE 500 MG/1
1000 INJECTION, POWDER, FOR SOLUTION INTRAMUSCULAR; INTRAVENOUS ONCE
Refills: 0 | Status: COMPLETED | OUTPATIENT
Start: 2019-09-21 | End: 2019-09-21

## 2019-09-21 RX ADMIN — SODIUM CHLORIDE 2100 MILLILITER(S): 9 INJECTION INTRAMUSCULAR; INTRAVENOUS; SUBCUTANEOUS at 23:58

## 2019-09-21 RX ADMIN — Medication 975 MILLIGRAM(S): at 23:57

## 2019-09-21 RX ADMIN — CEFTRIAXONE 100 MILLIGRAM(S): 500 INJECTION, POWDER, FOR SOLUTION INTRAMUSCULAR; INTRAVENOUS at 23:57

## 2019-09-21 NOTE — ED PROVIDER NOTE - CLINICAL SUMMARY MEDICAL DECISION MAKING FREE TEXT BOX
30 y/o F 3 weeks post partum presents with left breast tenderness, fever for 1 day. Is currently nursing. Will do Sepsis labs, EKG, check urine for infection, provide fluids and re-asses. 30 y/o F 3 weeks post partum presents with left breast tenderness, and fever for 1 day. Is currently nursing. Will do Sepsis labs, EKG, check urine for infection, provide fluids and re-asses.

## 2019-09-21 NOTE — ED ADULT NURSE NOTE - CAS ELECT INFOMATION PROVIDED
Assessment    1  Encounter for preventive health examination (V70 0) (Z00 00)   2  Mouth sores (528 9) (K13 79)   3  Diarrhea (787 91) (R19 7)   4  Irregular periods (626 4) (N92 6)    Plan  Diarrhea, Mouth sores    · (1) ALLERGY, FOOD PANEL; Status:Active; Requested for:52Oge7537;    · (1) CBC/ PLT (NO DIFF); Status:Active; Requested for:12Jtf8702;    · (1) CELIAC DISEASE AB PROFILE; Status:Active; Requested for:42Sri0341;    · (1) COMPREHENSIVE METABOLIC PANEL; Status:Active; Requested for:59Xft3423;    · (1) IRON; Status:Active; Requested for:62Kjg7476;    · (1) TSH WITH FT4 REFLEX; Status:Active; Requested for:62Alk0187; Health Maintenance    · Follow-up visit in 1 year Evaluation and Treatment  Follow-up  Status: Hold For -  Scheduling  Requested for: 28Oac4397   · Begin a limited exercise program ; Status:Complete;   Done: 10OBT9039   · Begin or continue regular aerobic exercise  Gradually work up to at least 3 sessions of 30  minutes of exercise a week ; Status:Complete;   Done: 72YIK8141   · Drink plenty of fluids ; Status:Complete;   Done: 70IBG9368   · Eat a low fat and low cholesterol diet ; Status:Complete;   Done: 27JYG2478   · Eat a normal well-balanced diet ; Status:Complete;   Done: 72HOB6141   · Eat foods that are high in calcium ; Status:Complete;   Done: 16VVB6924   · Use a sun block product with an SPF of 15 or more ; Status:Complete;   Done:  94YQA1862   · Vitamins can help you get daily requirements that your diet may not be giving you ;  Status:Complete;   Done: 93OTN0684   · Call [phone] if: You find a new or different kind of lump in your breast ;  Status:Complete;   Done: 30HEJ6918   · Call [phone] if: You have any warning signs of skin cancer ; Status:Complete;    Done: 94TXD9739  Irregular periods    · 1 - Feli Stein MD, [facility] Co-Management  *  Status: Hold For -  Scheduling  Requested for: 22ZCQ1476  Care Summary provided   Juana Dee Yes    Discussion/Summary  health maintenance visit Currently, she eats an adequate diet  the risks and benefits of cervical cancer screening were discussed Breast cancer screening: the risks and benefits of breast cancer screening were discussed and breast cancer screening is not indicated  Colorectal cancer screening: the risks and benefits of colorectal cancer screening were discussed and colorectal cancer screening is not indicated  Screening lab work includes hemoglobin, glucose and thyroid function testing  The risks and benefits of immunizations were discussed and patient declines immunizations  She was advised to be evaluated by an ophthalmologist and a dentist  Advice and education were given regarding nutrition, calcium supplements, vitamin D supplements, reproductive health, contraception, sunscreen use, helmet use and seat belt use  Patient discussion: discussed with the patient  Chief Complaint  Pt here for Physical      History of Present Illness  HM, Adult Female: The patient is being seen for a health maintenance evaluation  The last health maintenance visit was 4 year(s) ago  General Health: The patient's health since the last visit is described as good  She has regular dental visits  She complains of vision problems  Vision care includes wearing glasses  She denies hearing loss  Immunizations status: not up to date  Lifestyle:  She consumes a diverse and healthy diet  She does not have any weight concerns  She does not exercise regularly  She does not use tobacco  She denies alcohol use  She denies drug use  Reproductive health:  she reports abnormal menses  Menstrual history: LMP: the last menstrual period was 8/29/17  The cycles are irregular  she uses no contraception  she is not sexually active  she denies prior pregnancies  Screening: cancer screening reviewed and updated  metabolic screening reviewed and updated  risk screening reviewed and updated     HPI: on and off diarrhea and mouth ulcers and cold symptoms for the last 6 months   feels like something stuck in back of her throat for the last 6 months  tired a lot   has an appt with Dr Avila Womack in October 2017   saw an ENT Dr Lewis Valdez and she was told that she has acid reflux      Review of Systems    Constitutional: No fever, no chills, feels well, no tiredness, no recent weight gain or weight loss  Eyes: No complaints of eye pain, no red eyes, no eyesight problems, no discharge, no dry eyes, no itching of eyes  ENT: as noted in HPI  Cardiovascular: No complaints of slow heart rate, no fast heart rate, no chest pain, no palpitations, no leg claudication, no lower extremity edema  Respiratory: No complaints of shortness of breath, no wheezing, no cough, no SOB on exertion, no orthopnea, no PND  Gastrointestinal: abdominal pain and diarrhea  Genitourinary: No complaints of dysuria, no incontinence, no pelvic pain, no dysmenorrhea, no vaginal discharge or bleeding  Musculoskeletal: No complaints of arthralgias, no myalgias, no joint swelling or stiffness, no limb pain or swelling  Integumentary: No complaints of skin rash or lesions, no itching, no skin wounds, no breast pain or lump  Neurological: No complaints of headache, no confusion, no convulsions, no numbness, no dizziness or fainting, no tingling, no limb weakness, no difficulty walking  Psychiatric: Not suicidal, no sleep disturbance, no anxiety or depression, no change in personality, no emotional problems  Endocrine: No complaints of proptosis, no hot flashes, no muscle weakness, no deepening of the voice, no feelings of weakness  Hematologic/Lymphatic: No complaints of swollen glands, no swollen glands in the neck, does not bleed easily, does not bruise easily  Active Problems    1  Cerebral palsy (343 9) (G80 9)   2  Epilepsy (345 90)   3  Mouth sores (528 9) (K13 79)   4  Need for influenza vaccination (V04 81) (Z23)   5   Pre-op evaluation (V72 84) (Z01 818)    Past Medical History    · History of A Premature Birth   · Acute otitis media (382 9) (H66 90)   · History of Intracerebral hemorrhage (431) (I61 9)    Surgical History    · History of Install Vagal Nerve Neurostimulator By Incision   · History of Primary Repair Of Ruptured Achilles Tendon   · History of Tonsillectomy With Adenoidectomy    Family History  Mother    · No significant family history  Father    · No significant family history    Social History    · Never a smoker    Current Meds   1  LaMICtal 200 MG Oral Tablet; TAKE 0 5 TABLET Twice daily; Therapy: 60HVR2077 to Recorded   2  Xyzal 5 MG TABS; TAKE 1 TABLET DAILY IN THE EVENING; Therapy: (GGICTUBA:75TMU7638) to Recorded   3  Zonisamide 100 MG Oral Capsule; TAKE 1 CAPSULE EVERY 12 HOURS DAILY; Therapy: 19OCT6567 to Recorded    Allergies    1  No Known Drug Allergies    2  No Known Environmental Allergies   3  No Known Food Allergies    Vitals   Recorded: 12Sep2017 09:29AM   Heart Rate 84   Respiration 16   Systolic 111 mm Hg   Diastolic 60 mm Hg   Height 5 ft 3 9 in   Weight 140 lb 8 oz   BMI Calculated 24 19 kg/m2   BSA Calculated 1 68 m2     Physical Exam    Constitutional   General appearance: No acute distress, well appearing and well nourished  Head and Face   Head and face: Normal     Palpation of the face and sinuses: No sinus tenderness  Eyes   Conjunctiva and lids: No swelling, erythema or discharge  Pupils and irises: Equal, round, reactive to light  Ophthalmoscopic examination: Normal fundi and optic discs  Ears, Nose, Mouth, and Throat   External inspection of ears and nose: Normal     Otoscopic examination: Tympanic membranes translucent with normal light reflex  Canals patent without erythema  Hearing: Normal     Nasal mucosa, septum, and turbinates: Normal without edema or erythema  Lips, teeth, and gums: Normal, good dentition      Oropharynx: Normal with no erythema, edema, exudate or lesions  Neck   Neck: Supple, symmetric, trachea midline, no masses  Thyroid: Normal, no thyromegaly  Pulmonary   Respiratory effort: No increased work of breathing or signs of respiratory distress  Percussion of chest: Normal     Auscultation of lungs: Clear to auscultation  Cardiovascular   Palpation of heart: Normal PMI, no thrills  Auscultation of heart: Normal rate and rhythm, normal S1 and S2, no murmurs  Examination of extremities for edema and/or varicosities: Normal     Chest   Chest: Normal     Abdomen   Abdomen: Non-tender, no masses  Liver and spleen: No hepatomegaly or splenomegaly  Examination for hernias: No hernia appreciated  Lymphatic   Palpation of lymph nodes in neck: No lymphadenopathy  Palpation of lymph nodes in axillae: No lymphadenopathy  Palpation of lymph nodes in groin: No lymphadenopathy  Musculoskeletal   Gait and station: Normal     Digits and nails: Normal without clubbing or cyanosis  Joints, bones, and muscles: Normal     Range of motion: Normal     Stability: Normal     Muscle strength/tone: Normal     Skin   Skin and subcutaneous tissue: Normal without rashes or lesions  Palpation of skin and subcutaneous tissue: Normal turgor  Neurologic   Cranial nerves: Cranial nerves II-XII intact  Cortical function: Normal mental status  Reflexes: 2+ and symmetric  Sensation: No sensory loss  Coordination: Normal finger to nose and heel to shin  Psychiatric   Judgment and insight: Normal     Orientation to person, place, and time: Normal     Recent and remote memory: Intact  Mood and affect: Normal        Future Appointments    Date/Time Provider Specialty Site   10/19/2017 08:40 AM TIMO Partida   Gastroenterology Adult [place] [address]     Signatures   Electronically signed by : Poppy Leon MD; Sep 12 2017  9:56AM EST                       (Author) DC instructions

## 2019-09-21 NOTE — ED PROVIDER NOTE - PROGRESS NOTE DETAILS
Pt c/o of having pain at her episiotomy site plan to do exam, lab results checked and discussed with pt.

## 2019-09-21 NOTE — ED PROVIDER NOTE - CHPI ED SYMPTOMS POS
chills, dysuria, body aches, ANALILIA breast pain, HA/FEVER chills, dysuria, body aches, left breast pain, HA/FEVER

## 2019-09-21 NOTE — ED PROVIDER NOTE - NS ED ROS FT
(+)ANALILIA breast pain. (+) chills. (+)fever. no weight change.  no recent travel, no recent abox, no sick contacts  no recent change in medications  no rash, no bruises  no visual changes no eye discharge  no cough cold or congestion,   no sob, no chest pain  follows with cardiology  no stress test, no cath  no orthopnea, no pnd  (+) abd pain, no n/v/d  no endoscopy, no colonoscopy  (+)dysuria. no hematuria, no change in urinary habits  (+)body aches. no joint pain, no deformity  (+) headache, no paresthesia (+)Left breast pain. (+) chills. (+)fever. no weight change.  no recent travel, no recent abox, no sick contacts  no recent change in medications  no rash, no bruises  no visual changes no eye discharge  no cough cold or congestion,   no sob, no chest pain  follows with cardiology  no stress test, no cath  no orthopnea, no pnd  (+) abd pain, no n/v/d  no endoscopy, no colonoscopy  (+)dysuria. no hematuria, no change in urinary habits  (+)body aches. no joint pain, no deformity  (+) headache, no paresthesia

## 2019-09-21 NOTE — ED ADULT NURSE NOTE - OBJECTIVE STATEMENT
29y Female A&Ox4 c/o fever. Pt states she was shivering and felt cold, checked her temp and recorded 102.6F orally. Pt states she has had abdominal pain in her LLQ since she gave birth approximately 3 weeks ago. Pt denies n/v/d, recent rash, chest pain, shortness of breath. Pt states she has had some bloody show in her urine but has been consistent since she gave birth. 29y Female A&Ox4 c/o fever. Pt states she was shivering and felt cold, checked her temp and recorded 102.6F orally. Pt states she has had abdominal pain in her LLQ since she gave birth approximately 3 weeks ago. Pt also reports breast tenderness. Pt denies n/v/d, recent rash, chest pain, shortness of breath/difficulty breathing, changes in GI/ habits. Pt states she has had some bloody show in her urine but has been consistent and improving since she gave birth.

## 2019-09-21 NOTE — ED ADULT NURSE NOTE - IN THE PAST 12 MONTHS HAVE YOU USED DRUGS OTHER THAN THOSE REQUIRED FOR MEDICAL REASON?
May 30, 2017: Hattie Mosher is a 57 year old female who is seen in f/u up for MRI on 5/26/17 on lumbar spine.  Low back and left radicular leg pain, recurrent since 2011    MR LUMBAR SPINE W/O CONTRAST 5/26/2017 5:18 PM     History: Lumbago with sciatica, left side, Other chronic pain.  Comparison: Lumbar spine MRI from 2011     Technique: Sagittal STIR, diffusion weighted, T1-weighted, T2-weighted  and axial T2-weighted images of the lumbar spine were obtained without  intravenous contrast.      Findings: There are 5 lumbar-type vertebrae assumed for the purposes  of this dictation.  The tip of the conus medullaris is at  L1.  The  lumbar vertebral column appears normally aligned.  There is multilevel  disc height narrowing at L3-4 L4-5 and S1 with disc desiccation. Bone  marrow signal intensity is within normal limits and no abnormal signal  is visible.      On a level by level basis:     L1-2: No significant spinal canal or foraminal narrowing.     L2-3: Mild disc bulge without any significant spinal canal or  foraminal stenosis..     L3-4: Disc bulge, small left subarticular disc protrusion which abuts  the exiting left L3 nerve root. Mild degenerative facet joint  arthropathy resulting in mild right/mild to moderate left foraminal  narrowing. Mild effacement of the left lateral recess. The spinal  canal is mildly narrowed.     L4-5: Circumferential disc bulge with superimposed bilateral  subarticular disc protrusions which extend into the neural foramen and  abut the exiting L4 nerve roots. There is mild to moderate bilateral  neural foraminal stenosis.     L5-S1: Asymmetric disc bulge eccentric to the left and accompanying  moderate degenerative facet joint arthropathy. No significant spinal  canal stenosis, however there is severe left foraminal stenosis with  likely impingement of the exiting left L5 nerve root and mild right  foraminal narrowing.      Visualized portions of the retroperitoneal structures  are  unremarkable..         Impression: Multilevel degenerative changes which are most significant  at L5-S1 as detailed above     I have personally reviewed the examination and initial interpretation  and I agree with the findings.     MELISSA ALDRICH MD        PMH:  Past Medical History:   Diagnosis Date     Apnea, sleep 2007    reeval 12/11 Mild NOELLE     Essential hypertension 6/7/2013     Migraine headaches      Seizure disorder, complex partial (H)      Seizures (H)        Active problem list:  Patient Active Problem List   Diagnosis     Generalized convulsive epilepsy (H)     NOELLE (obstructive sleep apnea)     Perforation of tympanic membrane     Epilepsy (H)     Hypertrophic and atrophic condition of skin     Hyponatremia     Essential hypertension     Low back pain     SIADH (syndrome of inappropriate ADH production): probable med-induced       FH:  Family History   Problem Relation Age of Onset     Neurologic Disorder Mother      MS     Cardiovascular Father      Aneurysm     Glaucoma Brother      Retinal detachment No family hx of      Macular Degeneration No family hx of        SH:  Social History     Social History     Marital status:      Spouse name: N/A     Number of children: N/A     Years of education: N/A     Occupational History      [employer]     Social History Main Topics     Smoking status: Never Smoker     Smokeless tobacco: Never Used     Alcohol use 1.2 oz/week     2 Standard drinks or equivalent per week      Comment: 1-2 drinks/week     Drug use: No     Sexual activity: Yes     Partners: Male     Other Topics Concern     Caffeine Concern Yes     Drinks several cups of coffee/day     Sleep Concern Yes     Reports history of untreated sleep apnea     Social History Narrative       MEDS:  See EMR, reviewed  ALL:  See EMR, reviewed    REVIEW OF SYSTEMS:  CONSTITUTIONAL:NEGATIVE for fever, chills, change in weight  INTEGUMENTARY/SKIN: NEGATIVE for worrisome rashes, moles or  lesions  EYES: NEGATIVE for vision changes or irritation  ENT/MOUTH: NEGATIVE for ear, mouth and throat problems  RESP:NEGATIVE for significant cough or SOB  BREAST: NEGATIVE for masses, tenderness or discharge  CV: NEGATIVE for chest pain, palpitations or peripheral edema  GI: NEGATIVE for nausea, abdominal pain, heartburn, or change in bowel habits  :NEGATIVE for frequency, dysuria, or hematuria  :NEGATIVE for frequency, dysuria, or hematuria  NEURO: NEGATIVE for weakness, dizziness or paresthesias  ENDOCRINE: NEGATIVE for temperature intolerance, skin/hair changes  HEME/ALLERGY/IMMUNE: NEGATIVE for bleeding problems  PSYCHIATRIC: NEGATIVE for changes in mood or affect      OBJECTIVE:  Her exam is unchanged with low back discomfort and intermittent episodes of left-sided leg discomfort.  Strength is intact at the hip, knee, ankle and foot.  Sensation is intact as well, intermittent numbness and tingling.  Distal pulses intact.  Overlying skin is normal.  Appropriate in affect and conversation.      ASSESSMENT:  Lumbar spine degenerative disk and joint disease with left-sided sciatica complaints.      PLAN:  We went over MRI results in detail.  She knows there has been a change compared to her MRI in 2011.  Epidural injection offered, declined.  The patient would like to try physical therapy for a strengthening program and attempt at lumbar traction, even education on a Pilates reformer approach to back maintenance and she would like to see if a health club that she had access to would have a Pilates reformer machine.  If it is not helpful, she knows an epidural could be considered and also a back surgical referral.  She would like to avoid each of these for now.            No

## 2019-09-21 NOTE — ED PROVIDER NOTE - PATIENT PORTAL LINK FT
You can access the FollowMyHealth Patient Portal offered by [facility] by registering at the following website: [email or web address]. By joining [employer]’s FollowMyHealth portal, you will also be able to view your health information using other applications (apps) compatible with our system.

## 2019-09-21 NOTE — ED ADULT TRIAGE NOTE - CHIEF COMPLAINT QUOTE
patient states that she has had fever all day took ibuprofen last dose at 2230, body aches and light headed and HA not eating well, painful urination, patient brought to CC for sepsis

## 2019-09-21 NOTE — ED PROVIDER NOTE - PHYSICAL EXAMINATION
Constitutional : Appears comfortably, talking in full sentences  Head :NC AT , no swelling  Eyes :eomi, no swelling  Mouth :mm moist,  Neck : supple, trachea in midline  Chest :Tru air entry, symm chest expansion, no distress  Heart :S1 S2 distant  Abdomen :abd soft, non tender  Musc/Skel :ext no swelling, no deformity, no spine tenderness, distal pulses present  Neuro  :AAO 3 no focal deficits Constitutional : Appears anxious, talking in full sentences  Head :NC AT , no swelling  Eyes :eomi, no swelling  Mouth :mm dry  Neck : supple, trachea in midline  Chest :Tru air entry, symm chest expansion, no distress  Heart :S1 S2 distant  Abdomen :abd soft, RUQ tenderness to palpation, post-partum abd  Musc/Skel :ext no swelling, no deformity, no spine tenderness, distal pulses present, no calf tenderness to palpation. Left breast tenderness to palpation and erythema. No pus discharge or open wound compared to the right side, no edema or erythema.   Neuro  :AAO 3 no focal deficits Constitutional : Appears anxious, talking in full sentences  Head :NC AT , no swelling  Eyes :eomi, no swelling  Mouth :mm dry  Neck : supple, trachea in midline  Chest :Tru air entry, symm chest expansion, no distress  Heart :S1 S2 distant  Abdomen :abd soft, RUQ tenderness to palpation, post-partum abd  Musc/Skel :ext no swelling, no deformity, no spine tenderness, distal pulses present, no calf tenderness to palpation. Left breast tenderness to palpation and erythema. No pus discharge or open wound compared to the right breast, which notes no edema or erythema.   Neuro  :AAO 3 no focal deficits

## 2019-09-22 VITALS
TEMPERATURE: 99 F | DIASTOLIC BLOOD PRESSURE: 65 MMHG | OXYGEN SATURATION: 99 % | RESPIRATION RATE: 20 BRPM | SYSTOLIC BLOOD PRESSURE: 101 MMHG | HEART RATE: 88 BPM

## 2019-09-22 LAB
ALBUMIN SERPL ELPH-MCNC: 4.4 G/DL — SIGNIFICANT CHANGE UP (ref 3.3–5.2)
ALP SERPL-CCNC: 74 U/L — SIGNIFICANT CHANGE UP (ref 40–120)
ALT FLD-CCNC: 81 U/L — HIGH
ANION GAP SERPL CALC-SCNC: 17 MMOL/L — SIGNIFICANT CHANGE UP (ref 5–17)
APPEARANCE UR: CLEAR — SIGNIFICANT CHANGE UP
APTT BLD: 37.1 SEC — HIGH (ref 27.5–36.3)
AST SERPL-CCNC: 59 U/L — HIGH
BACTERIA # UR AUTO: ABNORMAL
BASOPHILS # BLD AUTO: 0.04 K/UL — SIGNIFICANT CHANGE UP (ref 0–0.2)
BASOPHILS NFR BLD AUTO: 0.4 % — SIGNIFICANT CHANGE UP (ref 0–2)
BILIRUB SERPL-MCNC: 0.3 MG/DL — LOW (ref 0.4–2)
BILIRUB UR-MCNC: NEGATIVE — SIGNIFICANT CHANGE UP
BUN SERPL-MCNC: 8 MG/DL — SIGNIFICANT CHANGE UP (ref 8–20)
CALCIUM SERPL-MCNC: 8.9 MG/DL — SIGNIFICANT CHANGE UP (ref 8.6–10.2)
CHLORIDE SERPL-SCNC: 102 MMOL/L — SIGNIFICANT CHANGE UP (ref 98–107)
CO2 SERPL-SCNC: 21 MMOL/L — LOW (ref 22–29)
COLOR SPEC: YELLOW — SIGNIFICANT CHANGE UP
CREAT SERPL-MCNC: 0.81 MG/DL — SIGNIFICANT CHANGE UP (ref 0.5–1.3)
DIFF PNL FLD: ABNORMAL
EOSINOPHIL # BLD AUTO: 0.03 K/UL — SIGNIFICANT CHANGE UP (ref 0–0.5)
EOSINOPHIL NFR BLD AUTO: 0.3 % — SIGNIFICANT CHANGE UP (ref 0–6)
EPI CELLS # UR: SIGNIFICANT CHANGE UP
GLUCOSE SERPL-MCNC: 125 MG/DL — HIGH (ref 70–115)
GLUCOSE UR QL: NEGATIVE MG/DL — SIGNIFICANT CHANGE UP
HCT VFR BLD CALC: 43.1 % — SIGNIFICANT CHANGE UP (ref 34.5–45)
HGB BLD-MCNC: 13.3 G/DL — SIGNIFICANT CHANGE UP (ref 11.5–15.5)
IMM GRANULOCYTES NFR BLD AUTO: 0.6 % — SIGNIFICANT CHANGE UP (ref 0–1.5)
INR BLD: 1.21 RATIO — HIGH (ref 0.88–1.16)
KETONES UR-MCNC: NEGATIVE — SIGNIFICANT CHANGE UP
LEUKOCYTE ESTERASE UR-ACNC: ABNORMAL
LYMPHOCYTES # BLD AUTO: 1.77 K/UL — SIGNIFICANT CHANGE UP (ref 1–3.3)
LYMPHOCYTES # BLD AUTO: 17.5 % — SIGNIFICANT CHANGE UP (ref 13–44)
MCHC RBC-ENTMCNC: 24.9 PG — LOW (ref 27–34)
MCHC RBC-ENTMCNC: 30.9 GM/DL — LOW (ref 32–36)
MCV RBC AUTO: 80.6 FL — SIGNIFICANT CHANGE UP (ref 80–100)
MONOCYTES # BLD AUTO: 0.61 K/UL — SIGNIFICANT CHANGE UP (ref 0–0.9)
MONOCYTES NFR BLD AUTO: 6 % — SIGNIFICANT CHANGE UP (ref 2–14)
NEUTROPHILS # BLD AUTO: 7.6 K/UL — HIGH (ref 1.8–7.4)
NEUTROPHILS NFR BLD AUTO: 75.2 % — SIGNIFICANT CHANGE UP (ref 43–77)
NITRITE UR-MCNC: NEGATIVE — SIGNIFICANT CHANGE UP
PH UR: 7 — SIGNIFICANT CHANGE UP (ref 5–8)
PLATELET # BLD AUTO: 225 K/UL — SIGNIFICANT CHANGE UP (ref 150–400)
POTASSIUM SERPL-MCNC: 4.2 MMOL/L — SIGNIFICANT CHANGE UP (ref 3.5–5.3)
POTASSIUM SERPL-SCNC: 4.2 MMOL/L — SIGNIFICANT CHANGE UP (ref 3.5–5.3)
PROT SERPL-MCNC: 7.7 G/DL — SIGNIFICANT CHANGE UP (ref 6.6–8.7)
PROT UR-MCNC: 15 MG/DL
PROTHROM AB SERPL-ACNC: 14 SEC — HIGH (ref 10–12.9)
RBC # BLD: 5.35 M/UL — HIGH (ref 3.8–5.2)
RBC # FLD: 15.6 % — HIGH (ref 10.3–14.5)
RBC CASTS # UR COMP ASSIST: ABNORMAL /HPF (ref 0–4)
SODIUM SERPL-SCNC: 140 MMOL/L — SIGNIFICANT CHANGE UP (ref 135–145)
SP GR SPEC: 1 — LOW (ref 1.01–1.02)
UROBILINOGEN FLD QL: NEGATIVE MG/DL — SIGNIFICANT CHANGE UP
WBC # BLD: 10.11 K/UL — SIGNIFICANT CHANGE UP (ref 3.8–10.5)
WBC # FLD AUTO: 10.11 K/UL — SIGNIFICANT CHANGE UP (ref 3.8–10.5)
WBC UR QL: SIGNIFICANT CHANGE UP /HPF (ref 0–5)

## 2019-09-22 PROCEDURE — 93010 ELECTROCARDIOGRAM REPORT: CPT

## 2019-09-22 PROCEDURE — 83605 ASSAY OF LACTIC ACID: CPT

## 2019-09-22 PROCEDURE — 71046 X-RAY EXAM CHEST 2 VIEWS: CPT | Mod: 26

## 2019-09-22 PROCEDURE — 96365 THER/PROPH/DIAG IV INF INIT: CPT

## 2019-09-22 PROCEDURE — 87040 BLOOD CULTURE FOR BACTERIA: CPT

## 2019-09-22 PROCEDURE — 85610 PROTHROMBIN TIME: CPT

## 2019-09-22 PROCEDURE — 87086 URINE CULTURE/COLONY COUNT: CPT

## 2019-09-22 PROCEDURE — 85730 THROMBOPLASTIN TIME PARTIAL: CPT

## 2019-09-22 PROCEDURE — 80053 COMPREHEN METABOLIC PANEL: CPT

## 2019-09-22 PROCEDURE — 81001 URINALYSIS AUTO W/SCOPE: CPT

## 2019-09-22 PROCEDURE — 99284 EMERGENCY DEPT VISIT MOD MDM: CPT | Mod: 25

## 2019-09-22 PROCEDURE — 71046 X-RAY EXAM CHEST 2 VIEWS: CPT

## 2019-09-22 PROCEDURE — 85027 COMPLETE CBC AUTOMATED: CPT

## 2019-09-22 PROCEDURE — 93005 ELECTROCARDIOGRAM TRACING: CPT

## 2019-09-22 PROCEDURE — 36415 COLL VENOUS BLD VENIPUNCTURE: CPT

## 2019-09-22 RX ORDER — CEFPODOXIME PROXETIL 100 MG
1 TABLET ORAL
Qty: 14 | Refills: 0
Start: 2019-09-22 | End: 2019-09-28

## 2019-09-22 RX ADMIN — CEFTRIAXONE 1000 MILLIGRAM(S): 500 INJECTION, POWDER, FOR SOLUTION INTRAMUSCULAR; INTRAVENOUS at 01:21

## 2019-09-22 RX ADMIN — Medication 975 MILLIGRAM(S): at 01:21

## 2019-09-22 NOTE — ED ADULT NURSE REASSESSMENT NOTE - NS ED NURSE REASSESS COMMENT FT1
Pt returned from XRay with no complaints of pain or discomfort at this time. PIV site WNL, pt placed on cardiac monitor. Awaiting results, safety maintained, call light provided, will continue to monitor.

## 2019-09-22 NOTE — ED ADULT NURSE REASSESSMENT NOTE - NS ED NURSE REASSESS COMMENT FT1
Pt returned from room A13 with MD Medina for exam, pt placed back on monitor, PIV site WNL. Pt status unchanged, safety maintained, call light provided, will continue to monitor.

## 2019-09-23 LAB
CULTURE RESULTS: SIGNIFICANT CHANGE UP
SPECIMEN SOURCE: SIGNIFICANT CHANGE UP

## 2019-09-27 LAB
CULTURE RESULTS: SIGNIFICANT CHANGE UP
CULTURE RESULTS: SIGNIFICANT CHANGE UP
SPECIMEN SOURCE: SIGNIFICANT CHANGE UP
SPECIMEN SOURCE: SIGNIFICANT CHANGE UP

## 2019-10-10 NOTE — OB RN PATIENT PROFILE - PRO INTERPRETER NEED 2
English 5-Fu Pregnancy And Lactation Text: This medication is Pregnancy Category X and contraindicated in pregnancy and in women who may become pregnant. It is unknown if this medication is excreted in breast milk.

## 2020-07-28 NOTE — OB RN PATIENT PROFILE - NS PRO TDAP RECEIVED Y/N
-- DO NOT REPLY / DO NOT REPLY ALL --  -- Message is from the [employer]--    COVID-19 Universal Screening: Positive    Transfer to RN      Chief Complaint:  fever sore throat chest discomfort cough diarrhea chills headache body ache     Caller Information       Type Contact Phone    07/28/2020 06:43 PM Phone (Incoming) Cm Mcguire (Self) [phone] (H)          Provider Name:  [facility] Site Name:  [facility] Phone Number:  n/a no...

## 2020-11-10 NOTE — ED ADULT NURSE NOTE - AS SC BRADEN MOISTURE
----- Message from Nir Peterson MD sent at 11/10/2020  1:28 PM CST -----  Please schedule for interlaminar epidural steroid injection at C7-T1    
Ok to schedule ?  
Pt scheduled AURELIANO 11/24. Covid test 11/21.   
(4) rarely moist

## 2021-05-29 NOTE — OB RN DELIVERY SUMMARY - BABY A: WEIGHT IN POUNDS (FROM GRAMS), DELIVERY
Physical exam forms have been faxed to the number listed within message. Left message on patient's mother's voicemail that forms were faxed.    8

## 2021-06-26 NOTE — OB RN DELIVERY SUMMARY - NS_TRANSFUSREACT_OBGYN_ALL_OB
Spinal Block    Patient location during procedure: OR  Start time: 6/3/2021 10:32 AM  End time: 6/3/2021 10:39 AM  Reason for block: primary anesthetic    Staffing:  Performing  Anesthesiologist: Ruchi Wu MD    Preanesthetic Checklist  Completed: patient identified, risks, benefits, and alternatives discussed, timeout performed, consent obtained, airway assessed, oxygen available, suction available, emergency drugs available and hand hygiene performed  Spinal Block  Patient position: sitting  Prep: ChloraPrep and site prepped and draped  Patient monitoring: heart rate, continuous pulse ox, blood pressure and cardiac monitor  Approach: midline  Location: L3-4  Injection technique: single-shot  Needle type: pencil-tip   Needle gauge: 24 G               No

## 2021-07-09 NOTE — OB RN PATIENT PROFILE - URINARY CATHETER
Division of Vascular Surgery        Follow Up    Aortobifemoral Bypass (9/15/20)    Chief Complaint:      PAD surveillance    History of Present Illness:      Efrain Hoover is a 76 y.o. woman who presents for follow up after undergoing aortobifemoral bypass for aortoiliac occlusive disease. From the surgery aspect she has done well, denies any pain in her legs when she walks now, no symptoms to suggest claudication, ischemic rest pain. She does not have any open wounds or sores in her feet. She has been very active, walking and exercising without any pain in her legs. She continues to have issues with tolerating food and has been followed by GI, underwent dilation not too long ago and is due for another endoscopy soon.     Medical History:     Past Medical History:   Diagnosis Date    Anxiety     Depression     Diabetes mellitus (HCC)     oral meds    Glaucoma     Hearing loss     Hypertension     Migraines     PVD (peripheral vascular disease) ([facility] [address]        Surgical History:     Past Surgical History:   Procedure Laterality Date    ABDOMINAL AORTIC ANEURYSM REPAIR N/A 9/15/2020    AORTAL BIFEMORAL BYPASS  **CELLSAVER** performed by Edna Contreras MD at [address] [place] [address] AORTO-FEMORAL BYPASS GRAFT  09/15/2020    APPENDECTOMY      BREAST SURGERY      \"crystals removed from right breast\"    CATARACT REMOVAL  2015    CHOLECYSTECTOMY      COLONOSCOPY      ENDOSCOPY, COLON, DIAGNOSTIC      TONSILLECTOMY      UPPER GASTROINTESTINAL ENDOSCOPY  06/08/2021    w/EUS FNA    UPPER GASTROINTESTINAL ENDOSCOPY N/A 6/8/2021    EGD W/EUS FNA performed by Moustapha Fofana MD at [facility]    UPPER GASTROINTESTINAL ENDOSCOPY  6/8/2021    EGD DILATION BALLOON 6-7.5MM performed by Moustapha Fofana MD at [facility]    UPPER GASTROINTESTINAL ENDOSCOPY N/A 6/8/2021    EGD BIOPSY performed by Moustapha Fofana MD at [facility]       Family History:     Family History   Problem Relation Age of Onset    Colon Cancer Mother     Prostate Cancer Father     Thyroid Cancer Sister        Allergies:       Dilaudid [hydromorphone hcl], Sulfamethoxazole, Sulfamethoxazole-trimethoprim, Trimethoprim, and Tetanus toxoid    Medications:      Current Outpatient Medications   Medication Sig Dispense Refill    lisinopril (PRINIVIL;ZESTRIL) 20 MG tablet Take 1 tablet by mouth daily 90 tablet 3    ELIQUIS 5 MG TABS tablet TAKE 1 TABLET BY MOUTH TWICE DAILY 180 tablet 3    latanoprost (XALATAN) 0.005 % ophthalmic solution INSTILL 1 DROP INTO EACH EYE AT BEDTIME      omeprazole (PRILOSEC) 40 MG delayed release capsule Take 1 capsule by mouth every morning (before breakfast) 30 capsule 5    aspirin (ASPIRIN LOW DOSE ADULT) 81 MG chewable tablet Take 1 tablet by mouth daily 30 tablet 3    metFORMIN (GLUCOPHAGE) 500 MG tablet Take 1 tablet by mouth 2 times daily (with meals) 60 tablet 5    VORTIoxetine (TRINTELLIX) 10 MG TABS tablet Take 1 tablet by mouth      busPIRone (BUSPAR) 5 MG tablet Take 1 tablet by mouth      nicotine (NICODERM CQ) 21 MG/24HR Place 1 patch onto the skin daily (Patient not taking: Reported on 4/15/2021) 42 patch 1     No current facility-administered medications for this visit. Social History:     Tobacco:    reports that she has been smoking cigarettes. She started smoking about 45 years ago. She has a 41.00 pack-year smoking history. She has never used smokeless tobacco.  Alcohol:      reports no history of alcohol use. Drug Use:  reports no history of drug use. Review of Systems:     Review of Systems   Constitutional: Negative for chills and fever. HENT: Negative for congestion. Eyes: Negative for visual disturbance. Respiratory: Negative for chest tightness and shortness of breath. Cardiovascular: Negative for chest pain and leg swelling. Gastrointestinal: Positive for abdominal pain. Endocrine: Negative. Genitourinary: Negative. Musculoskeletal: Negative. Skin: Negative for color change and wound. Allergic/Immunologic: Negative. Neurological: Negative for facial asymmetry, speech difficulty, weakness and numbness. Hematological: Negative. Psychiatric/Behavioral: Negative. Physical Exam:     Vitals:  BP (!) 167/80 (Site: Left Upper Arm, Position: Sitting, Cuff Size: Medium Adult)   Pulse 96   Temp 99 °F (37.2 °C)   Resp 19   Ht 5' (1.524 m)   Wt 120 lb (54.4 kg)   SpO2 96%   BMI 23.44 kg/m²     Physical Exam  Constitutional:       Appearance: She is well-developed and well-groomed. Eyes:      Extraocular Movements: Extraocular movements intact. Conjunctiva/sclera: Conjunctivae normal.   Neck:      Vascular: No carotid bruit. Cardiovascular:      Rate and Rhythm: Normal rate and regular rhythm. Pulses:           Radial pulses are 2+ on the right side and 2+ on the left side. Dorsalis pedis pulses are 2+ on the right side and 2+ on the left side. Posterior tibial pulses are 2+ on the right side and 2+ on the left side. Pulmonary:      Effort: Pulmonary effort is normal. No respiratory distress. Abdominal:      Palpations: Abdomen is soft. Tenderness: There is no abdominal tenderness. Musculoskeletal:      Cervical back: Full passive range of motion without pain. Right lower leg: No swelling. No edema. Left lower leg: No swelling. No edema. Right foot: Normal capillary refill. No swelling or tenderness. Left foot: Normal capillary refill. No swelling or tenderness. Feet:      Right foot:      Skin integrity: No ulcer or skin breakdown. Left foot:      Skin integrity: No ulcer or skin breakdown. Skin:     General: Skin is warm. Capillary Refill: Capillary refill takes less than 2 seconds. Neurological:      Mental Status: She is alert and oriented to person, place, and time. GCS: GCS eye subscore is 4. GCS verbal subscore is 5.  GCS motor subscore is 6. Sensory: Sensation is intact. Motor: Motor function is intact. Psychiatric:         Mood and Affect: Mood normal.         Speech: Speech normal.         Behavior: Behavior normal.         Thought Content: Thought content normal.       Imaging/Labs:     Normal PVRs and CHRISTEL bilaterally         Pre intervention ABIs right 0.46 and left 0.43    Assessment and Plan: Aortoiliac occlusive disease, s/p Aortobifemoral bypass  · Optimal medical therapy with antiplatelet and statin regimen  · Daily exercise to improve cardiovascular health  · Yearly surveillance now with non-invasive testing (PVRs/CHRISTEL)  · Sooner if symptoms return    Electronically signed by Maribel Menon MD on 7/9/21 at 10:53 AM EDT      [address] [place] [address],[address]: [phone]  C: [phone]  Email: [email or web address] no

## 2021-10-16 NOTE — OB RN TRIAGE NOTE - NS_SUPPORTPERSONNAME_OBGYN_ALL_OB_FT
Dr. Schultz notified of voiding trial results at 08:45AM.     Packing removed. Patient stood at edge of bed for ~3 minutes; bladder drained completely into parra bag.    300mL sterile water instilled into bladder via parra catheter.     Patient immediately ambulated to bathroom; voided 210mL.           
Raji Estrada

## 2021-11-27 NOTE — OB RN PATIENT PROFILE - PT NEEDS ASSIST
ID-weekend cross coverage  Afebrile, WBC 9.9, Cr 0.51  Fluid culture-11/26-aerobic -NG, no organisms  Anaerobic-NG, no organisms. Await final cultures.   Continue cefepime, Flagyl no

## 2021-12-04 ENCOUNTER — EMERGENCY (EMERGENCY)
Facility: HOSPITAL | Age: 31
LOS: 1 days | Discharge: HOME | End: 2021-12-04
Attending: EMERGENCY MEDICINE
Payer: MEDICAID

## 2021-12-04 VITALS
RESPIRATION RATE: 20 BRPM | DIASTOLIC BLOOD PRESSURE: 89 MMHG | HEIGHT: 63 IN | HEART RATE: 100 BPM | TEMPERATURE: 98 F | SYSTOLIC BLOOD PRESSURE: 148 MMHG | OXYGEN SATURATION: 98 %

## 2021-12-04 PROCEDURE — 99283 EMERGENCY DEPT VISIT LOW MDM: CPT

## 2021-12-04 RX ORDER — PREDNISOLONE SODIUM PHOSPHATE 1 %
2 DROPS OPHTHALMIC (EYE) ONCE
Refills: 0 | Status: COMPLETED | OUTPATIENT
Start: 2021-12-04 | End: 2021-12-04

## 2021-12-04 RX ORDER — IBUPROFEN 200 MG
600 TABLET ORAL ONCE
Refills: 0 | Status: COMPLETED | OUTPATIENT
Start: 2021-12-04 | End: 2021-12-04

## 2021-12-04 RX ADMIN — Medication 2 DROP(S): at 20:47

## 2021-12-04 RX ADMIN — Medication 600 MILLIGRAM(S): at 20:46

## 2021-12-04 NOTE — ED ADULT TRIAGE NOTE - ESI TRIAGE ACUITY LEVEL, MLM
Pt aox4. No visible signs of distress. VSS.  Tolerating medication regimen without any signs or symptoms of adverse reactions.  Pt declines any analgesics at this time.  On room air, no complaints of SOB.  Heparin drip in use, no signs of active bleeding.  Bruising noted to left arm, pt declines any recent worsening.  Prevena wound vac to abdominal incision is CDI.  Tolerating diet without n/v. Continues to have loose stools. +BS, +BM  Standby assist with FWW.  Bed locked and in low position.  Call light within reach and able to make all needs be known.  Will continue to monitor.   4

## 2021-12-04 NOTE — ED PROVIDER NOTE - PATIENT PORTAL LINK FT
You can access the FollowMyHealth Patient Portal offered by [facility] by registering at the following website: [email or web address]. By joining [facility]’s FollowMyHealth portal, you will also be able to view your health information using other applications (apps) compatible with our system.

## 2021-12-04 NOTE — ED PROVIDER NOTE - EYES, MLM
Clear bilaterally, pupils equal, round and reactive to light, pupils nondilated b/l, erythema to the sclera, VA 20/50 R eye, 20/50 L eye, eye flouro stained, no FB seen, no dendritic lesions, no corneal abrasion Clear bilaterally, pupils equal, round and reactive to light, pupils nondilated b/l, erythema to the sclera, VA 20/50 R eye, 20/50 L eye, eye flouro stained, no FB seen, no dendritic lesions, no corneal abrasion, tonopen pressure 8

## 2021-12-04 NOTE — ED PROVIDER NOTE - NSFOLLOWUPINSTRUCTIONS_ED_ALL_ED_FT
Follow up with ophthalmologist within 2-3 days   Motrin/ Tylenol as needed for pain  Prednisolone drops two time a day until you see ophthalmologist   Return to the ED for worsening blurry vision, headaches, or any new or concerning symptoms    Iritis    WHAT YOU NEED TO KNOW:    Iritis is inflammation of your iris. The iris is the colored part of your eye.    Eye Anatomy         DISCHARGE INSTRUCTIONS:    Call your doctor or ophthalmologist if:   •You have severe eye pain and a headache.      •Your vision suddenly gets worse.      •You have nausea or are vomiting.      •Your pain gets worse, even after treatment.      •You see halos or rainbows around lights.      •You have questions or concerns about your condition or care.      Medicines: You may need any of the following:   •Cycloplegic eyedrops dilate your pupil and relax your eye muscles. This helps decrease pain and light sensitivity.      •Steroid eyedrops help decrease pain and inflammation. These are only used for a short time to relieve the inflammation. You may be given steroid medicine as pills if the cause of your iritis is not an infection.      •Acetaminophen decreases pain and fever. It is available without a doctor's order. Ask how much to take and how often to take it. Follow directions. Read the labels of all other medicines you are using to see if they also contain acetaminophen, or ask your doctor or pharmacist. Acetaminophen can cause liver damage if not taken correctly. Do not use more than 4 grams (4,000 milligrams) total of acetaminophen in one day.       •Take your medicine as directed. Contact your healthcare provider if you think your medicine is not helping or if you have side effects. Tell him of her if you are allergic to any medicine. Keep a list of the medicines, vitamins, and herbs you take. Include the amounts, and when and why you take them. Bring the list or the pill bottles to follow-up visits. Carry your medicine list with you in case of an emergency.      Manage iritis:   •Apply a warm compress to your eye. Wet a washcloth in warm water and wring it out. Place it gently over your eye for 20 minutes 3 to 4 times each day. This will help soothe your eye and decrease inflammation.      •Wear dark sunglasses. This will help prevent pain and light sensitivity. Make sure the sunglasses have UVA and UVB protection. This will protect your eyes when you go outside.      •Use eyedrops safely. If your treatment plan includes eyedrops, it is important to use them as directed. Your provider may give you detailed instructions to follow. The eyedrops may also come with safety instructions. Follow all instructions to help prevent an infection. Do not touch the tip of the bottle to your eye. Germs from your eye can spread to the medicine bottle.  Steps 1 2 3 4       Follow up with your doctor or ophthalmologist within 24 hours: Write down your questions so you remember to ask them during your visits.

## 2021-12-04 NOTE — ED ADULT NURSE NOTE - NSIMPLEMENTINTERV_GEN_ALL_ED
Implemented All Universal Safety Interventions:  [place] to call system. Call bell, personal items and telephone within reach. Instruct patient to call for assistance. Room bathroom lighting operational. Non-slip footwear when patient is off stretcher. Physically safe environment: no spills, clutter or unnecessary equipment. Stretcher in lowest position, wheels locked, appropriate side rails in place.

## 2021-12-04 NOTE — ED PROVIDER NOTE - CLINICAL SUMMARY MEDICAL DECISION MAKING FREE TEXT BOX
Pt with likely iritis, will treat with opthalmic steroids and Motrin. Will dc with optho for follow up.

## 2021-12-04 NOTE — ED ADULT NURSE NOTE - OBJECTIVE STATEMENT
Pt presents to the ED for R eye pain with associated blurry vision which stared this AM.  Patient was sent to ED from urgent care.  Pt denies any trauma associated with eye, denies discharge.

## 2021-12-04 NOTE — ED PROVIDER NOTE - OBJECTIVE STATEMENT
31 year old female presents to the ED for R eye pain which started this morning when the pt woke up. Reports pain associated with blurry vision, redness to the eye, as well as light sensitivity. Wears glasses for distance, no contact lenses. Denies dc from eye, trauma to the eye. 31 year old female no PMHx presents to the ED for R eye pain which started this morning when the pt woke up. Reports pain associated with blurry vision, redness to the eye, as well as light sensitivity. Wears glasses for distance, no contact lenses. Denies dc from eye, trauma to the eye.

## 2021-12-04 NOTE — ED PROVIDER NOTE - CARE PROVIDER_API CALL
Katherine Osborn)  Ophthalmology  [address], [address]  [place] [address]  Phone: [phone]  Fax: [phone]  Follow Up Time:

## 2021-12-06 NOTE — ED PROVIDER NOTE - LOCATION
abdomen Detail Level: Detailed Quality 130: Documentation Of Current Medications In The Medical Record: Current Medications Documented Quality 431: Preventive Care And Screening: Unhealthy Alcohol Use - Screening: Patient not identified as an unhealthy alcohol user when screened for unhealthy alcohol use using a systematic screening method Quality 226: Preventive Care And Screening: Tobacco Use: Screening And Cessation Intervention: Patient screened for tobacco use and is an ex/non-smoker Quality 110: Preventive Care And Screening: Influenza Immunization: Influenza Immunization Administered during Influenza season Quality 111:Pneumonia Vaccination Status For Older Adults: Pneumococcal Vaccination Previously Received

## 2022-02-16 NOTE — ED ADULT NURSE NOTE - NS ED NURSE DISCH DISPOSITION
Detail Level: Detailed
Quality 110: Preventive Care And Screening: Influenza Immunization: Influenza Immunization Administered during Influenza season
Discharged

## 2022-03-15 NOTE — OB RN PATIENT PROFILE - THE IMPORTANCE OF THE NEWBORN'S COMFORT AND THERMOREGULATION DURING SKIN TO SKIN: ANY PART OF INFANT SKIN NOT TOUCHING PARENT'S SKIN IS TO BE COVERED BY A BLANKET.
History and Physical  Internal Medicine  Priscilla Mccoy MD    CHIEF COMPLAINT:  fatigue      HISTORY OF PRESENT ILLNESS:      The patient is a 79 y.o. male patient of Dr Theodora Loja who presents with as per ER -   Patient presents with fever and fatigue. Patient's fever has been as high as 101.4. He has not been given any medication for this. He mentions that last night he had some shaking and then this morning he felt extremely fatigued and weak. It is not focal.  He denies any pain. He does mention that he has wounds on his lower extremities for which he follows with the wound clinic. He otherwise denies any syncope, chest pain, shortness of breath, nausea, vomiting, abdominal pain, or dysuria. Past Medical History:   Diagnosis Date    Arthritis     Cancer ([address]     breast    Cellulitis     [facility]     CKD (chronic kidney disease) stage 3, GFR 30-59 ml/min ([facility])     Diabetes mellitus ([address]     History of lumpectomy     Hx of blood clots     Hyperlipidemia     Hypertension     Left ventricular failure ([facility])     Macular degeneration     Obesity     Orthostatic hypotension     PE (pulmonary thromboembolism) [facility])     Pressure injury of both heels, unstageable ([address]     Staph aureus infection     Venous insufficiency            Past Surgical History:   Procedure Laterality Date    [facility]  PICC POWERPIC SINGLE  9/2/2021         MASTECTOMY      TOE AMPUTATION      TOE SURGERY      TONSILLECTOMY         Medications Prior to Admission:    Medications Prior to Admission: docusate sodium (COLACE) 100 MG capsule, Take 100 mg by mouth nightly  tamsulosin (FLOMAX) 0.4 MG capsule, Take 0.4 mg by mouth daily  bisacodyl (DULCOLAX) 10 MG suppository, Place 10 mg rectally daily as needed for Constipation  diphenhydrAMINE-zinc acetate (BENADRYL) 1-0.1 % cream, Apply topically every 4 hours as needed for Itching Apply topically 3 times daily as needed.   Sodium Phosphates (FLEET) 7-19 GM/118ML, Place 1 enema rectally daily as needed  magnesium hydroxide (MILK OF MAGNESIA) 400 MG/5ML suspension, Take 30 mLs by mouth daily as needed for Constipation  Polyethylene Glycol 400 1 % SOLN, Apply 1 drop to eye every 4 hours as needed  ferrous sulfate (IRON 325) 325 (65 Fe) MG tablet, Take 325 mg by mouth daily   zinc sulfate (ZINCATE) 220 (50 Zn) MG capsule, Take 1 capsule by mouth daily for 14 days  miconazole (MICOTIN) 2 % powder, Apply topically 2 times daily. ascorbic acid (VITAMIN C) 500 MG tablet, Take 1 tablet by mouth 2 times daily  Vitamin D (CHOLECALCIFEROL) 50 MCG (2000 UT) TABS tablet, Take 1 tablet by mouth daily  furosemide (LASIX) 40 MG tablet, Take 20 mg by mouth daily   omeprazole (PRILOSEC) 20 MG delayed release capsule, Take 20 mg by mouth daily  Multiple Vitamins-Minerals (THERAPEUTIC MULTIVITAMIN-MINERALS) tablet, Take 1 tablet by mouth daily  Multiple Vitamins-Minerals (PRESERVISION AREDS PO), Take 1 tablet by mouth 2 times daily (with meals)  acetaminophen (TYLENOL) 325 MG tablet, Take 650 mg by mouth every 4 hours as needed for Pain or Fever   calcium carbonate (TUMS) 500 MG chewable tablet, Take 1 tablet by mouth every 4 hours as needed for Heartburn  apixaban (ELIQUIS) 5 MG TABS tablet, Take 5 mg by mouth 2 times daily (before meals)   gabapentin (NEURONTIN) 100 MG capsule, Take 200 mg by mouth Daily with supper. atorvastatin (LIPITOR) 80 MG tablet, Take 80 mg by mouth at bedtime   INSULIN LISP PROT & LISP, HUM, (75-25) 100 UNIT/ML SUSP, Inject 72 Units into the skin daily (with breakfast)   INSULIN LISP PROT & LISP, HUM, (75-25) 100 UNIT/ML SUSP, Inject 55 Units into the skin Daily with supper   clotrimazole-betamethasone (LOTRISONE) cream, Apply  topically as needed. Apply topically 2 times daily.   ipratropium-albuterol (DUONEB) 0.5-2.5 (3) MG/3ML SOLN nebulizer solution, Inhale 3 mLs into the lungs every 4 hours (while awake) for 30 doses    Allergies: Clindamycin/lincomycin and Sulfa antibiotics    Social History:    reports that he has never smoked. He has never used smokeless tobacco. He reports previous alcohol use of about 1.0 standard drink of alcohol per week. He reports that he does not use drugs. Family History:   family history is not on file.     REVIEW OF SYSTEMS:  As above in the HPI, otherwise negative    Vital Signs:  /65   Pulse 101   Temp 98.8 °F (37.1 °C) (Oral)   Resp 22   Ht 6' (1.829 m)   Wt 246 lb (111.6 kg)   SpO2 96%   BMI 33.36 kg/m²     Physical Exam:    General appearance: alert, appears stated age, cooperative and no distress  Head: Normocephalic, without obvious abnormality, atraumatic  Eyes: negative  Throat: normal findings: lips normal without lesions  Neck: no adenopathy, no carotid bruit, no JVD and supple, symmetrical, trachea midline  Back: negative  Lungs: clear to auscultation bilaterally  Chest wall: no tenderness  Heart: regular rate and rhythm  Abdomen: soft, non-tender; bowel sounds normal; no masses,  no organomegaly  Extremities: DSD in place  Pulses: 2+ and symmetric  Skin: Skin color, texture, turgor normal. No rashes or lesions  Lymph nodes: Cervical, supraclavicular, and axillary nodes normal.  Neurologic: Grossly normal  Rectal: deferred    LABS:    Recent Results (from the past 24 hour(s))   CBC with Auto Differential    Collection Time: 03/14/22 12:17 PM   Result Value Ref Range    WBC 27.4 (H) 4.5 - 11.5 E9/L    RBC 3.97 3.80 - 5.80 E12/L    Hemoglobin 10.5 (L) 12.5 - 16.5 g/dL    Hematocrit 34.2 (L) 37.0 - 54.0 %    MCV 86.1 80.0 - 99.9 fL    MCH 26.4 26.0 - 35.0 pg    MCHC 30.7 (L) 32.0 - 34.5 %    RDW 16.9 (H) 11.5 - 15.0 fL    Platelets 817 926 - 915 E9/L    MPV 9.4 7.0 - 12.0 fL    Neutrophils % 94.1 (H) 43.0 - 80.0 %    Immature Granulocytes % 1.2 0.0 - 5.0 %    Lymphocytes % 2.2 (L) 20.0 - 42.0 %    Monocytes % 2.4 2.0 - 12.0 %    Eosinophils % 0.0 0.0 - 6.0 %    Basophils % 0.1 0.0 - 2.0 %    Neutrophils Absolute 25.82 (H) 1.80 - 7.30 E9/L    Immature Granulocytes # 0.34 E9/L    Lymphocytes Absolute 0.59 (L) 1.50 - 4.00 E9/L    Monocytes Absolute 0.65 0.10 - 0.95 E9/L    Eosinophils Absolute 0.00 (L) 0.05 - 0.50 E9/L    Basophils Absolute 0.04 0.00 - 0.20 E9/L    Poikilocytes 1+     Schistocytes 1+     Ovalocytes 1+    Comprehensive Metabolic Panel w/ Reflex to MG    Collection Time: 03/14/22 12:17 PM   Result Value Ref Range    Sodium 138 132 - 146 mmol/L    Potassium reflex Magnesium 4.6 3.5 - 5.0 mmol/L    Chloride 102 98 - 107 mmol/L    CO2 27 22 - 29 mmol/L    Anion Gap 9 7 - 16 mmol/L    Glucose 180 (H) 74 - 99 mg/dL    BUN 46 (H) 6 - 23 mg/dL    CREATININE 1.4 (H) 0.7 - 1.2 mg/dL    GFR Non-African American 48 >=60 mL/min/1.73    GFR African American 58     Calcium 8.7 8.6 - 10.2 mg/dL    Total Protein 6.0 (L) 6.4 - 8.3 g/dL    Albumin 3.1 (L) 3.5 - 5.2 g/dL    Total Bilirubin 0.6 0.0 - 1.2 mg/dL    Alkaline Phosphatase 136 (H) 40 - 129 U/L    ALT 9 0 - 40 U/L    AST 23 0 - 39 U/L   Troponin    Collection Time: 03/14/22 12:17 PM   Result Value Ref Range    Troponin, High Sensitivity 460 (H) 0 - 11 ng/L   Lactate, Sepsis    Collection Time: 03/14/22 12:17 PM   Result Value Ref Range    Lactic Acid, Sepsis 1.9 0.5 - 1.9 mmol/L   C-Reactive Protein    Collection Time: 03/14/22 12:17 PM   Result Value Ref Range    CRP 8.9 (H) 0.0 - 0.4 mg/dL   Sedimentation Rate    Collection Time: 03/14/22 12:17 PM   Result Value Ref Range    Sed Rate 44 (H) 0 - 15 mm/Hr   COVID-19, Rapid    Collection Time: 03/14/22 12:17 PM    Specimen: Nasopharyngeal Swab   Result Value Ref Range    SARS-CoV-2, NAAT Not Detected Not Detected   EKG 12 Lead    Collection Time: 03/14/22  1:12 PM   Result Value Ref Range    Ventricular Rate 99 BPM    Atrial Rate 99 BPM    QRS Duration 80 ms    Q-T Interval 332 ms    QTc Calculation (Bazett) 426 ms    R Axis -8 degrees    T Axis 115 degrees   Troponin    Collection Time: 03/14/22 1:53 PM   Result Value Ref Range    Troponin, High Sensitivity 521 (H) 0 - 11 ng/L   Brain Natriuretic Peptide    Collection Time: 03/14/22  1:53 PM   Result Value Ref Range    Pro-BNP 5,752 (H) 0 - 450 pg/mL   Antistreptolysin O Titer    Collection Time: 03/14/22  2:41 PM   Result Value Ref Range    ASO 42 0 - 200 IU/mL   EKG 12 Lead    Collection Time: 03/14/22  2:54 PM   Result Value Ref Range    Ventricular Rate 91 BPM    Atrial Rate 91 BPM    QRS Duration 90 ms    Q-T Interval 384 ms    QTc Calculation (Bazett) 472 ms    R Axis -11 degrees    T Axis 92 degrees   Lactate, Sepsis    Collection Time: 03/14/22  5:25 PM   Result Value Ref Range    Lactic Acid, Sepsis 1.9 0.5 - 1.9 mmol/L   Urinalysis with Microscopic    Collection Time: 03/14/22  6:06 PM   Result Value Ref Range    Color, UA Yellow Straw/Yellow    Clarity, UA Clear Clear    Glucose, Ur Negative Negative mg/dL    Bilirubin Urine Negative Negative    Ketones, Urine TRACE (A) Negative mg/dL    Specific Gravity, UA 1.015 1.005 - 1.030    Blood, Urine Negative Negative    pH, UA 5.5 5.0 - 9.0    Protein, UA TRACE Negative mg/dL    Urobilinogen, Urine 0.2 <2.0 E.U./dL    Nitrite, Urine Negative Negative    Leukocyte Esterase, Urine Negative Negative    WBC, UA NONE 0 - 5 /HPF    RBC, UA 0-1 0 - 2 /HPF    Bacteria, UA RARE (A) None Seen /HPF   POCT Glucose    Collection Time: 03/14/22  6:25 PM   Result Value Ref Range    Meter Glucose 226 (H) 74 - 99 mg/dL   POCT Glucose    Collection Time: 03/14/22  7:37 PM   Result Value Ref Range    Meter Glucose 242 (H) 74 - 99 mg/dL   Basic Metabolic Panel    Collection Time: 03/15/22  2:24 AM   Result Value Ref Range    Sodium 137 132 - 146 mmol/L    Potassium 4.4 3.5 - 5.0 mmol/L    Chloride 100 98 - 107 mmol/L    CO2 26 22 - 29 mmol/L    Anion Gap 11 7 - 16 mmol/L    Glucose 284 (H) 74 - 99 mg/dL    BUN 52 (H) 6 - 23 mg/dL    CREATININE 1.6 (H) 0.7 - 1.2 mg/dL    GFR Non-African American 41 >=60 mL/min/1.73    GFR African American 49     Calcium 8.3 (L) 8.6 - 10.2 mg/dL   CBC with Auto Differential    Collection Time: 03/15/22  2:24 AM   Result Value Ref Range    WBC 19.4 (H) 4.5 - 11.5 E9/L    RBC 3.77 (L) 3.80 - 5.80 E12/L    Hemoglobin 9.9 (L) 12.5 - 16.5 g/dL    Hematocrit 32.6 (L) 37.0 - 54.0 %    MCV 86.5 80.0 - 99.9 fL    MCH 26.3 26.0 - 35.0 pg    MCHC 30.4 (L) 32.0 - 34.5 %    RDW 17.2 (H) 11.5 - 15.0 fL    Platelets 851 197 - 311 E9/L    MPV 9.3 7.0 - 12.0 fL    Neutrophils % 94.8 (H) 43.0 - 80.0 %    Immature Granulocytes % 0.6 0.0 - 5.0 %    Lymphocytes % 2.1 (L) 20.0 - 42.0 %    Monocytes % 2.3 2.0 - 12.0 %    Eosinophils % 0.0 0.0 - 6.0 %    Basophils % 0.2 0.0 - 2.0 %    Neutrophils Absolute 18.40 (H) 1.80 - 7.30 E9/L    Immature Granulocytes # 0.12 E9/L    Lymphocytes Absolute 0.41 (L) 1.50 - 4.00 E9/L    Monocytes Absolute 0.45 0.10 - 0.95 E9/L    Eosinophils Absolute 0.00 (L) 0.05 - 0.50 E9/L    Basophils Absolute 0.03 0.00 - 0.20 E9/L    Anisocytosis 1+     Poikilocytes 1+     Ovalocytes 1+    POCT Glucose    Collection Time: 03/15/22  5:58 AM   Result Value Ref Range    Meter Glucose 348 (H) 74 - 99 mg/dL       Radiology:     Chest  Xray:  No results found for this or any previous visit. Results for orders placed during the hospital encounter of 03/14/22    XR CHEST PORTABLE    Narrative  EXAMINATION:  ONE X-RAY VIEW OF THE CHEST    3/14/2022 12:53 pm    COMPARISON:  August 25, 2021, August 31, 2021    HISTORY:  ORDERING SYSTEM PROVIDED HISTORY: Fever  TECHNOLOGIST PROVIDED HISTORY:  Reason for exam:->Fever    FINDINGS:  Heart size is unable to be accurately assessed on this single portable view  of the chest, but appears to be stable. Lung volumes are shallow with  bibasilar airspace opacities, likely on the basis of atelectasis. No  evidence of a pleural effusion or pneumothorax. No acute osseous abnormality.     Impression  Shallow lung volumes with bibasilar atelectasis. Other:  none      ASSESSMENT:      Principal Problem:    Sepsis ([facility]  Active Problems:    HTN (hypertension)    Obesity (BMI 30.0-34. 9)    Diabetes mellitus type 2, uncontrolled ([facility]    Essential hypertension, benign    Hyperlipidemia with target LDL less than 100    Troponin level elevated    Diabetic foot infection ([facility]  Resolved Problems:    * No resolved hospital problems.  *      PLAN:    Cardio on case  ID consult    Mariano Ledezma MD  6:16 AM  3/15/2022 Statement Selected

## 2022-12-12 NOTE — ED ADULT NURSE NOTE - CCCP TRG CHIEF CMPLNT
You must understand that you have received treatment at an Urgent Care facility only, and that you may be  released before all of your medical problems are known or treated. Urgent Care facilities are not equipped to  handle life threatening emergencies. It is recommended that you seek care at an Emergency Department for  further evaluation of worsening or concerning symptoms, or possibly life threatening conditions as  discussed.     abdominal pain

## 2023-04-24 NOTE — ED PROVIDER NOTE - OBJECTIVE STATEMENT
History Source; Pt  30 y/o F pt with no significant PMHx presents to the ED c/o sudden onset fever that started at 6am today, as well as chills, dysuria, ANALILIA breast pain, HA and body aches. Highest noted temperature 102.9°F. PTA she took 2 Ibuprofen. Denies recent travel, rash, N/V, CP, SOB, pedal edema.     In August she delivered at 38 weeks to a healthy . She had a vaginal delivery and her baby went home with her. She is currently nursing. Since giving birth she has had abd pain, but assumed it would resolve. Code sepsis initiated. History Source; Pt  30 y/o F pt with no significant PMHx presents to the ED c/o sudden onset fever that started at 6am today, as well as chills, dysuria, left breast pain, HA and body aches. Highest noted temperature 102.9°F. PTA she took 2 Ibuprofen. Denies recent travel, rash, N/V, CP, SOB, pedal edema.     In August she delivered at 38 weeks to a healthy . She had a vaginal delivery and her baby did not spend any extra time in the hospital. She is currently nursing. Since giving birth she has had abd pain, but assumed it would resolve. Code sepsis initiated. History Source; Pt  30 y/o F pt with no significant PMHx presents to the ED c/o sudden onset fever that started at 6am today, as well as chills, dysuria, left breast pain, HA and body aches. Highest noted temperature at home was 102.9°F. PTA she took 2 Ibuprofen. Denies recent travel, rash, N/V, CP, SOB, pedal edema.     In August she had a vaginal delivery of a healthy 38 week old, who is currently doing fine. .She is currently nursing, and has not noticed anything concerning about the baby. Since giving birth she has had abd pain, but assumed it would resolve. Code sepsis initiated. Yes

## 2023-10-18 NOTE — ED ADULT NURSE NOTE - BOWEL SOUNDS LLQ
Consent: Written consent obtained, risks reviewed including but not limited to crusting, scabbing, blistering, scarring, darker or lighter pigmentary change, and/or incomplete removal. present

## 2024-03-02 ENCOUNTER — EMERGENCY (EMERGENCY)
Facility: HOSPITAL | Age: 34
LOS: 1 days | Discharge: HOME | End: 2024-03-02
Attending: STUDENT IN AN ORGANIZED HEALTH CARE EDUCATION/TRAINING PROGRAM
Payer: MEDICAID

## 2024-03-02 VITALS
OXYGEN SATURATION: 98 % | SYSTOLIC BLOOD PRESSURE: 142 MMHG | WEIGHT: 187.17 LBS | RESPIRATION RATE: 20 BRPM | HEART RATE: 84 BPM | HEIGHT: 61 IN | DIASTOLIC BLOOD PRESSURE: 102 MMHG | TEMPERATURE: 97 F

## 2024-03-02 VITALS
HEART RATE: 69 BPM | DIASTOLIC BLOOD PRESSURE: 77 MMHG | OXYGEN SATURATION: 98 % | TEMPERATURE: 98 F | SYSTOLIC BLOOD PRESSURE: 109 MMHG | RESPIRATION RATE: 18 BRPM

## 2024-03-02 LAB
ALBUMIN SERPL ELPH-MCNC: 4.1 G/DL — SIGNIFICANT CHANGE UP (ref 3.3–5.2)
ALP SERPL-CCNC: 50 U/L — SIGNIFICANT CHANGE UP (ref 40–120)
ALT FLD-CCNC: 20 U/L — SIGNIFICANT CHANGE UP
ANION GAP SERPL CALC-SCNC: 16 MMOL/L — SIGNIFICANT CHANGE UP (ref 5–17)
APPEARANCE UR: ABNORMAL
APTT BLD: 34.5 SEC — SIGNIFICANT CHANGE UP (ref 24.5–35.6)
AST SERPL-CCNC: 16 U/L — SIGNIFICANT CHANGE UP
BACTERIA # UR AUTO: ABNORMAL /HPF
BASOPHILS # BLD AUTO: 0.05 K/UL — SIGNIFICANT CHANGE UP (ref 0–0.2)
BASOPHILS NFR BLD AUTO: 0.4 % — SIGNIFICANT CHANGE UP (ref 0–2)
BILIRUB SERPL-MCNC: <0.2 MG/DL — LOW (ref 0.4–2)
BILIRUB UR-MCNC: NEGATIVE — SIGNIFICANT CHANGE UP
BLD GP AB SCN SERPL QL: SIGNIFICANT CHANGE UP
BUN SERPL-MCNC: 9.1 MG/DL — SIGNIFICANT CHANGE UP (ref 8–20)
CALCIUM SERPL-MCNC: 8.5 MG/DL — SIGNIFICANT CHANGE UP (ref 8.4–10.5)
CAST: 0 /LPF — SIGNIFICANT CHANGE UP (ref 0–4)
CHLORIDE SERPL-SCNC: 102 MMOL/L — SIGNIFICANT CHANGE UP (ref 96–108)
CO2 SERPL-SCNC: 22 MMOL/L — SIGNIFICANT CHANGE UP (ref 22–29)
COLOR SPEC: YELLOW — SIGNIFICANT CHANGE UP
CREAT SERPL-MCNC: 0.64 MG/DL — SIGNIFICANT CHANGE UP (ref 0.5–1.3)
DIFF PNL FLD: NEGATIVE — SIGNIFICANT CHANGE UP
EGFR: 119 ML/MIN/1.73M2 — SIGNIFICANT CHANGE UP
EOSINOPHIL # BLD AUTO: 0.23 K/UL — SIGNIFICANT CHANGE UP (ref 0–0.5)
EOSINOPHIL NFR BLD AUTO: 1.9 % — SIGNIFICANT CHANGE UP (ref 0–6)
GLUCOSE SERPL-MCNC: 105 MG/DL — HIGH (ref 70–99)
GLUCOSE UR QL: NEGATIVE MG/DL — SIGNIFICANT CHANGE UP
HCG SERPL-ACNC: <4 MIU/ML — SIGNIFICANT CHANGE UP
HCT VFR BLD CALC: 43 % — SIGNIFICANT CHANGE UP (ref 34.5–45)
HGB BLD-MCNC: 13.9 G/DL — SIGNIFICANT CHANGE UP (ref 11.5–15.5)
IMM GRANULOCYTES NFR BLD AUTO: 0.6 % — SIGNIFICANT CHANGE UP (ref 0–0.9)
INR BLD: 1.06 RATIO — SIGNIFICANT CHANGE UP (ref 0.85–1.18)
KETONES UR-MCNC: NEGATIVE MG/DL — SIGNIFICANT CHANGE UP
LEUKOCYTE ESTERASE UR-ACNC: ABNORMAL
LYMPHOCYTES # BLD AUTO: 3.65 K/UL — HIGH (ref 1–3.3)
LYMPHOCYTES # BLD AUTO: 30.3 % — SIGNIFICANT CHANGE UP (ref 13–44)
MCHC RBC-ENTMCNC: 27.4 PG — SIGNIFICANT CHANGE UP (ref 27–34)
MCHC RBC-ENTMCNC: 32.3 GM/DL — SIGNIFICANT CHANGE UP (ref 32–36)
MCV RBC AUTO: 84.8 FL — SIGNIFICANT CHANGE UP (ref 80–100)
MONOCYTES # BLD AUTO: 0.66 K/UL — SIGNIFICANT CHANGE UP (ref 0–0.9)
MONOCYTES NFR BLD AUTO: 5.5 % — SIGNIFICANT CHANGE UP (ref 2–14)
NEUTROPHILS # BLD AUTO: 7.38 K/UL — SIGNIFICANT CHANGE UP (ref 1.8–7.4)
NEUTROPHILS NFR BLD AUTO: 61.3 % — SIGNIFICANT CHANGE UP (ref 43–77)
NITRITE UR-MCNC: NEGATIVE — SIGNIFICANT CHANGE UP
PH UR: 6.5 — SIGNIFICANT CHANGE UP (ref 5–8)
PLATELET # BLD AUTO: 288 K/UL — SIGNIFICANT CHANGE UP (ref 150–400)
POTASSIUM SERPL-MCNC: 3.7 MMOL/L — SIGNIFICANT CHANGE UP (ref 3.5–5.3)
POTASSIUM SERPL-SCNC: 3.7 MMOL/L — SIGNIFICANT CHANGE UP (ref 3.5–5.3)
PROT SERPL-MCNC: 6.8 G/DL — SIGNIFICANT CHANGE UP (ref 6.6–8.7)
PROT UR-MCNC: NEGATIVE MG/DL — SIGNIFICANT CHANGE UP
PROTHROM AB SERPL-ACNC: 11.7 SEC — SIGNIFICANT CHANGE UP (ref 9.5–13)
RBC # BLD: 5.07 M/UL — SIGNIFICANT CHANGE UP (ref 3.8–5.2)
RBC # FLD: 13.4 % — SIGNIFICANT CHANGE UP (ref 10.3–14.5)
RBC CASTS # UR COMP ASSIST: 3 /HPF — SIGNIFICANT CHANGE UP (ref 0–4)
SODIUM SERPL-SCNC: 140 MMOL/L — SIGNIFICANT CHANGE UP (ref 135–145)
SP GR SPEC: 1.02 — SIGNIFICANT CHANGE UP (ref 1–1.03)
SQUAMOUS # UR AUTO: 23 /HPF — HIGH (ref 0–5)
UROBILINOGEN FLD QL: 1 MG/DL — SIGNIFICANT CHANGE UP (ref 0.2–1)
WBC # BLD: 12.04 K/UL — HIGH (ref 3.8–10.5)
WBC # FLD AUTO: 12.04 K/UL — HIGH (ref 3.8–10.5)
WBC UR QL: 6 /HPF — HIGH (ref 0–5)

## 2024-03-02 PROCEDURE — 76856 US EXAM PELVIC COMPLETE: CPT | Mod: 26

## 2024-03-02 PROCEDURE — 87491 CHLMYD TRACH DNA AMP PROBE: CPT

## 2024-03-02 PROCEDURE — 74177 CT ABD & PELVIS W/CONTRAST: CPT | Mod: 26,MC

## 2024-03-02 PROCEDURE — 74177 CT ABD & PELVIS W/CONTRAST: CPT | Mod: MC

## 2024-03-02 PROCEDURE — 81001 URINALYSIS AUTO W/SCOPE: CPT

## 2024-03-02 PROCEDURE — 85610 PROTHROMBIN TIME: CPT

## 2024-03-02 PROCEDURE — 96374 THER/PROPH/DIAG INJ IV PUSH: CPT | Mod: XU

## 2024-03-02 PROCEDURE — 96372 THER/PROPH/DIAG INJ SC/IM: CPT

## 2024-03-02 PROCEDURE — 87591 N.GONORRHOEAE DNA AMP PROB: CPT

## 2024-03-02 PROCEDURE — 76856 US EXAM PELVIC COMPLETE: CPT

## 2024-03-02 PROCEDURE — 99285 EMERGENCY DEPT VISIT HI MDM: CPT

## 2024-03-02 PROCEDURE — 85730 THROMBOPLASTIN TIME PARTIAL: CPT

## 2024-03-02 PROCEDURE — 76830 TRANSVAGINAL US NON-OB: CPT | Mod: 26

## 2024-03-02 PROCEDURE — 84702 CHORIONIC GONADOTROPIN TEST: CPT

## 2024-03-02 PROCEDURE — 86901 BLOOD TYPING SEROLOGIC RH(D): CPT

## 2024-03-02 PROCEDURE — 86850 RBC ANTIBODY SCREEN: CPT

## 2024-03-02 PROCEDURE — 80053 COMPREHEN METABOLIC PANEL: CPT

## 2024-03-02 PROCEDURE — 85025 COMPLETE CBC W/AUTO DIFF WBC: CPT

## 2024-03-02 PROCEDURE — 99284 EMERGENCY DEPT VISIT MOD MDM: CPT | Mod: 25

## 2024-03-02 PROCEDURE — 87086 URINE CULTURE/COLONY COUNT: CPT

## 2024-03-02 PROCEDURE — 76830 TRANSVAGINAL US NON-OB: CPT

## 2024-03-02 PROCEDURE — 86900 BLOOD TYPING SEROLOGIC ABO: CPT

## 2024-03-02 PROCEDURE — 36415 COLL VENOUS BLD VENIPUNCTURE: CPT

## 2024-03-02 RX ORDER — MORPHINE SULFATE 50 MG/1
4 CAPSULE, EXTENDED RELEASE ORAL ONCE
Refills: 0 | Status: DISCONTINUED | OUTPATIENT
Start: 2024-03-02 | End: 2024-03-02

## 2024-03-02 RX ORDER — CEFTRIAXONE 500 MG/1
500 INJECTION, POWDER, FOR SOLUTION INTRAMUSCULAR; INTRAVENOUS ONCE
Refills: 0 | Status: COMPLETED | OUTPATIENT
Start: 2024-03-02 | End: 2024-03-02

## 2024-03-02 RX ADMIN — MORPHINE SULFATE 4 MILLIGRAM(S): 50 CAPSULE, EXTENDED RELEASE ORAL at 08:33

## 2024-03-02 RX ADMIN — Medication 100 MILLIGRAM(S): at 11:39

## 2024-03-02 RX ADMIN — MORPHINE SULFATE 4 MILLIGRAM(S): 50 CAPSULE, EXTENDED RELEASE ORAL at 09:32

## 2024-03-02 RX ADMIN — CEFTRIAXONE 500 MILLIGRAM(S): 500 INJECTION, POWDER, FOR SOLUTION INTRAMUSCULAR; INTRAVENOUS at 11:39

## 2024-03-02 NOTE — ED ADULT NURSE REASSESSMENT NOTE - NS ED NURSE REASSESS COMMENT FT1
Report received 0715. Pt AOx4, breathing even and unlabored. Complaints of LLQ abdominal pain, radiating to L flank. Pt pending abdominal exam from provider, and pain medication. Safety maintained.

## 2024-03-02 NOTE — ED ADULT TRIAGE NOTE - CHIEF COMPLAINT QUOTE
Patient presents to ED with c/o left lower abdominal pain radiating to back times one week.  Per patient, "I have been ignoring it".  Denies N/V/D/urinary symptoms.  No meds PTA  No change in appetite

## 2024-03-02 NOTE — ED PROVIDER NOTE - PHYSICAL EXAMINATION
General: Awake, alert, lying in bed in NAD  HEENT: Normocephalic, atraumatic. No scleral icterus or conjunctival injection. EOMI. Moist mucous membranes. Oropharynx clear.   Neck:. Soft and supple.  Cardiac: RRR, Peripheral pulses 2+ and symmetric. No LE edema.  Resp: Lungs CTAB. No accessory muscle use  Abd:  no overlying skin changes, LLQ tenderness. Soft, non-distended. No guarding, rebound, or rigidity.  Back: Spine midline and non-tender.   Skin: No rashes, abrasions, or lacerations.  Neuro: AO x 4. Moves all extremities symmetrically. Motor strength and sensation grossly intact.  Psych: Appropriate mood and affect

## 2024-03-02 NOTE — ED PROVIDER NOTE - PATIENT PORTAL LINK FT
You can access the FollowMyHealth Patient Portal offered by [facility] by registering at the following website: [email or web address]. By joining [employer]’s FollowMyHealth portal, you will also be able to view your health information using other applications (apps) compatible with our system.

## 2024-03-02 NOTE — ED PROVIDER NOTE - OBJECTIVE STATEMENT
33-year-old female otherwise healthy presents with 1 week of worsening left lower abdominal pain.  Patient states she has had similar pain prior but not to this extent.  Pain is worse when she coughs or bears down.  Denies associated fevers, chills, N/V/D, constipation, urinary symptoms, vaginal bleeding or discharge.  LMP about 1 month ago. 33-year-old female otherwise healthy presents with 1 week of worsening left lower abdominal pain.  Patient states she has had similar pain prior but not to this extent.  Pain is worse when she coughs or bears down.  Denies associated fevers, chills, N/V/D, constipation, urinary symptoms, vaginal bleeding or discharge.  LMP about 1 month ago. Is sexually active with 1 new partner, no known hx of std or sti

## 2024-03-02 NOTE — ED PROVIDER NOTE - ATTENDING CONTRIBUTION TO CARE
I have seen and examined this patient and fully participated in the care of this patient as the teaching attending. I personally made/approved the management plan and take responsibility for the patient management.     I have reviewed the resident's documentation. The documentation has been edited as indicated to reflect my findings. Mayte Stevenson MD, Attending Physician

## 2024-03-02 NOTE — ED PROVIDER NOTE - PROGRESS NOTE DETAILS
pelvic performed with MD Ivan Hand: normal external genitalia. speculum exam with moderate amount of white discharge in vaginal vault, could not visualize cervix. Culture swabs sent from vagina. mild R adnexal tenderness on exam, no CMT. US has patient in for transport - if no torsion or large cyst or TOA seen on US, will treat empirically for PID. patient agreeable. signed out to Dr. Diez no TOA on ultrasound. Plan to treat her prophylactically for STDs per Dr. Stevenson. patient has outpt doctor to follow up with .

## 2024-03-02 NOTE — ED PROVIDER NOTE - NSFOLLOWUPINSTRUCTIONS_ED_ALL_ED_FT
Abdominal Pain    Many things can cause abdominal pain. Many times, abdominal pain is not caused by a disease and will improve without treatment. Your health care provider will do a physical exam to determine if there is a dangerous cause of your pain; blood tests and imaging may help determine the cause of your pain. However, in many cases, no cause may be found and you may need further testing as an outpatient. Monitor your abdominal pain for any changes.     Take medication as prescribed. We are treating you prophylactically for STDs.     SEEK IMMEDIATE MEDICAL CARE IF YOU HAVE ANY OF THE FOLLOWING SYMPTOMS: worsening abdominal pain, uncontrollable vomiting, profuse diarrhea, inability to have bowel movements or pass gas, black or bloody stools, fever accompanying chest pain or back pain, or fainting. These symptoms may represent a serious problem that is an emergency. Do not wait to see if the symptoms will go away. Get medical help right away. Call 911 and do not drive yourself to the hospital.

## 2024-03-03 LAB
CULTURE RESULTS: SIGNIFICANT CHANGE UP
SPECIMEN SOURCE: SIGNIFICANT CHANGE UP

## 2024-03-29 ENCOUNTER — EMERGENCY (EMERGENCY)
Facility: HOSPITAL | Age: 34
LOS: 1 days | Discharge: HOME | End: 2024-03-29
Attending: STUDENT IN AN ORGANIZED HEALTH CARE EDUCATION/TRAINING PROGRAM
Payer: MEDICAID

## 2024-03-29 VITALS
HEART RATE: 127 BPM | HEIGHT: 61 IN | DIASTOLIC BLOOD PRESSURE: 84 MMHG | TEMPERATURE: 98 F | RESPIRATION RATE: 20 BRPM | WEIGHT: 174.83 LBS | SYSTOLIC BLOOD PRESSURE: 137 MMHG | OXYGEN SATURATION: 96 %

## 2024-03-29 VITALS
HEART RATE: 103 BPM | SYSTOLIC BLOOD PRESSURE: 132 MMHG | RESPIRATION RATE: 18 BRPM | DIASTOLIC BLOOD PRESSURE: 90 MMHG | OXYGEN SATURATION: 99 %

## 2024-03-29 PROCEDURE — 99284 EMERGENCY DEPT VISIT MOD MDM: CPT | Mod: 25

## 2024-03-29 PROCEDURE — 96375 TX/PRO/DX INJ NEW DRUG ADDON: CPT

## 2024-03-29 PROCEDURE — 96366 THER/PROPH/DIAG IV INF ADDON: CPT

## 2024-03-29 PROCEDURE — 96365 THER/PROPH/DIAG IV INF INIT: CPT

## 2024-03-29 PROCEDURE — 99283 EMERGENCY DEPT VISIT LOW MDM: CPT

## 2024-03-29 RX ORDER — SODIUM CHLORIDE 9 MG/ML
1000 INJECTION, SOLUTION INTRAVENOUS ONCE
Refills: 0 | Status: COMPLETED | OUTPATIENT
Start: 2024-03-29 | End: 2024-03-29

## 2024-03-29 RX ORDER — DEXAMETHASONE 0.5 MG/5ML
10 ELIXIR ORAL ONCE
Refills: 0 | Status: COMPLETED | OUTPATIENT
Start: 2024-03-29 | End: 2024-03-29

## 2024-03-29 RX ORDER — KETOROLAC TROMETHAMINE 30 MG/ML
15 SYRINGE (ML) INJECTION ONCE
Refills: 0 | Status: DISCONTINUED | OUTPATIENT
Start: 2024-03-29 | End: 2024-03-29

## 2024-03-29 RX ORDER — IBUPROFEN 200 MG
1 TABLET ORAL
Qty: 20 | Refills: 0
Start: 2024-03-29 | End: 2024-04-02

## 2024-03-29 RX ADMIN — Medication 1 TABLET(S): at 07:41

## 2024-03-29 RX ADMIN — Medication 15 MILLIGRAM(S): at 09:13

## 2024-03-29 RX ADMIN — SODIUM CHLORIDE 1000 MILLILITER(S): 9 INJECTION, SOLUTION INTRAVENOUS at 07:41

## 2024-03-29 RX ADMIN — Medication 15 MILLIGRAM(S): at 07:41

## 2024-03-29 RX ADMIN — SODIUM CHLORIDE 1000 MILLILITER(S): 9 INJECTION, SOLUTION INTRAVENOUS at 09:19

## 2024-03-29 RX ADMIN — Medication 102 MILLIGRAM(S): at 07:41

## 2024-03-29 RX ADMIN — Medication 10 MILLIGRAM(S): at 09:19

## 2024-03-29 NOTE — ED PROVIDER NOTE - ENMT, MLM
Airway patent, Nasal mucosa clear. Mouth with normal mucosa. Throat has no vesicles, no oropharyngeal  and uvula is midline BL exudates with no clinical signs of PTA.

## 2024-03-29 NOTE — ED PROVIDER NOTE - OBJECTIVE STATEMENT
Patient with no significant past medical history presents emergency department for evaluation of throat pain.  Patient states that she had a gradual onset of pain over the last 4 days that has been constant nonradiating has become severe dull in nature made worse with swallowing improved by nothing.  Patient states that she is unable to liquids and solids at this time due to pain.  Patient denies fever shortness of breath vomiting dizziness weakness cough.

## 2024-03-29 NOTE — ED PROVIDER NOTE - CLINICAL SUMMARY MEDICAL DECISION MAKING FREE TEXT BOX
Patient with no significant past medical history presents emergency department for evaluation of throat pain.  Patient states that she had a gradual onset of pain over the last 4 days that has been constant nonradiating has become severe dull in nature made worse with swallowing improved by nothing.  Patient states that she is unable to liquids and solids at this time due to pain.  Patient denies fever shortness of breath vomiting dizziness weakness cough. Patient with bilateral exudative pharyngitis with no clinical indications of PTA, patient given medications in the ED improved able to tolerate liquids and solids.  Patient to DC home with continue medication follow-up to PCP P.  Patient educated about when to return to the ED if needed. PT verbalizes that he understands all instructions and results. Pt infomred that ED is open and availible 24/7 365 days a yr, encouraged to return to the ED if they have any change in condition, or feel the need for revaluation.

## 2024-03-29 NOTE — ED PROVIDER NOTE - ATTENDING APP SHARED VISIT CONTRIBUTION OF CARE
Pt with sore throat and fever x4 d.      physical - B/L tonsillar erythema, edema and exudates.  no evidence of pta.    plan - meds given. tolerating po. will d/c on amox. given return instructions.

## 2024-03-29 NOTE — ED PROVIDER NOTE - PATIENT PORTAL LINK FT
You can access the FollowMyHealth Patient Portal offered by [facility] by registering at the following website: [email or web address]. By joining [employer]’s FollowMyHealth portal, you will also be able to view your health information using other applications (apps) compatible with our system.

## 2024-03-29 NOTE — ED ADULT NURSE NOTE - OBJECTIVE STATEMENT
Pt a&ox4 complains of sore throat and nasal congestion since 3/26/24. Pt's respirations even and unlabored on room air, VSS, no distress noted.

## 2024-03-29 NOTE — ED PROVIDER NOTE - NSFOLLOWUPINSTRUCTIONS_ED_ALL_ED_FT
Patient education: Sore throat in adults (The Basics)  Written by the doctors and editors at [facility]  Please read the Disclaimer at the end of this page.    What causes sore throat?  Sore throat is usually caused by an infection. Two types of germs can cause it: viruses and bacteria.    People who have a sore throat caused by a virus do not usually need to see a doctor or nurse. But if you think that you might have been exposed to COVID-19, or if COVID-19 is common where you live, ask your doctor or nurse if you should be tested.    People who have a sore throat caused by bacteria might need to see a doctor or nurse. They might have a type of infection called strep throat. Only about 1 in 10 adults who seek medical care for sore throat have strep throat.    How can I tell if my sore throat is caused by a virus or strep throat?  It is hard to tell the difference. But there are some clues to look for.    People who have a sore throat caused by a virus usually have other symptoms, such as:    ?Stuffy or runny nose    ?Itchy or red eyes    ?Cough    People who have COVID-19 can have a sore throat as their only symptom. Or they can have other symptoms such as fever, cough, trouble breathing, and problems with their sense of smell or taste. In most cases, the only way to know for sure if you have COVID-19 is to get tested.    People who have strep throat do not usually have a cough, runny nose, or itchy or red eyes. They might have:    ?Severe throat pain    ?Fever (temperature higher than 100.4°F or 38°C)    ?Swollen glands in the neck    If you think that you have strep throat, the doctor or nurse can easily check. They can take a sample from the back of your throat and test it for the bacteria that cause strep throat.    Do I need antibiotics?  If you have an infection caused by a virus, you do not need antibiotics. But if you have strep throat, you should get antibiotics. Most people with strep throat get better without antibiotics, but doctors and nurses often prescribe them. This is because antibiotics often can prevent other problems that might be caused by strep throat. Plus, antibiotics can reduce the symptoms of strep throat and prevent you from spreading it to other people.    What can I do to feel better?  To relieve the pain of sore throat, you can:    ?Take over-the-counter pain medicine – Acetaminophen (sample brand name: Tylenol) or ibuprofen (sample brand names: Advil, Motrin) can help with throat pain.    ?Use sore throat lozenges or sprays – Using medicated sore throat lozenges or throat sprays can temporarily reduce throat pain.    ?Suck on hard candies, ice chips, or ice pops.    ?Gargle with salt water – Some people find that this helps with throat pain.    ?Use a cool mist humidifier – This adds moisture to the air to keep the throat from getting too dry and might help with pain.    ?Avoid smoking or being around people who are smoking – Smoke can make throat pain worse.    When can I go back to work or school?  If you have strep throat, wait 1 day after starting antibiotics. By then, you will be a lot less likely to spread the infection to others.    If you have COVID-19, stay home from work or school and "self-isolate" until your doctor or nurse tells you it's safe to stop. Self-isolation means staying apart from other people, even the people you live with. When you can stop self-isolation depends on how long it has been since you had symptoms, and in some cases, whether you have had a negative test (showing that the virus is no longer in your body).    If you have a sore throat that is not due to strep throat or COVID-19, you can go back to your usual activities as soon as you feel well. But it's still important to wash your hands often and cover your mouth if you cough.    When should I call the doctor?  Call your doctor or nurse if:    ?You have a fever of at least 101°F (38.4°C).    ?Your throat pain is severe within the first 2 days, or does not start to improve within 5 to 7 days.    ?You are having trouble getting enough to eat or drink.    ?You got antibiotics but still have symptoms after finishing them.    Call for an ambulance (in the [place] and [place], call [id number]) or go to the emergency department if you:    ?Have trouble breathing    ?Are drooling because you cannot swallow your saliva    ?Have swelling of the neck or tongue    ?Cannot move your neck, or have trouble opening your mouth    What can I do to prevent getting a sore throat again?  Wash your hands often with soap and water (figure 1). It is one of the best ways to prevent the spread of infection.

## 2024-03-29 NOTE — ED PROVIDER NOTE - ADDITIONAL NOTES AND INSTRUCTIONS:
PT was evaluated At [facility] ED and was found to have a condition that warranted time of to rest and heal from WORK/SCHOOL.   Clinton Musa PA-C

## 2024-03-29 NOTE — ED ADULT TRIAGE NOTE - CHIEF COMPLAINT QUOTE
Patient presents to ED with c/o fever, and sore throat since Tuesday.  Per patient, unable to swallow liquids/solids.  No meds PTA

## 2024-03-29 NOTE — ED PROVIDER NOTE - NS ED MD DISPO DISCHARGE CCDA
Nutrition and Exercise Counseling: The patient's Body mass index is 42 04 kg/m²  This is >99 %ile (Z= 2 34) based on CDC (Girls, 2-20 Years) BMI-for-age based on BMI available as of 8/22/2019      Nutrition counseling provided:  Anticipatory guidance for nutrition given and counseled on healthy eating habits, Educational material provided to patient/parent regarding nutrition, 5 servings of fruits/vegetables, Avoid juice/sugary drinks and Reviewed long term health goals and risks of obesity    Exercise counseling provided:  Anticipatory guidance and counseling on exercise and physical activity given, Educational material provided to patient/family on physical activity, Reduce screen time to less than 2 hours per day, 1 hour of aerobic exercise daily, Take stairs whenever possible and Reviewed long term health goals and risks of obesity Patient/Caregiver provided printed discharge information.

## 2024-07-23 NOTE — OB PROVIDER IHI INDUCTION/AUGMENTATION NOTE - NS_IHIPITOCINATTEND_OBGYN_ALL_OB_FT
[facility]   Procedure Discharge Instructions    Today you saw: Dr. Jarett Mcconnell     You had an:  Epidural steroid injection  -lumbar    Be cautious when walking. Numbness and/or weakness in the lower extremities may occur for up to 6-8 hours after the procedure due to effect of the local anesthetic  Do not drive for 6 hours. The effect of the local anesthetic could slow your reflexes.   You may resume your regular activities after 24 hours  Avoid strenuous activity for the first 24 hours  You may shower, however avoid swimming, tub baths or hot tubs for 24 hours following your procedure  You may have a mild to moderate increase in pain for several days following the injection.  It may take up to 14 days for the steroid medication to start working although you may feel the effect as early as a few days after the procedure.     You may use ice packs for 10-15 minutes, 3 to 4 times a day at the injection site for comfort  Do not use heat to painful areas for 6 to 8 hours. This will give the local anesthetic time to wear off and prevent you from accidentally burning your skin.   Unless you have been directed to avoid the use of anti-inflammatory medications (NSAIDS), you may use medications such as ibuprofen, Aleve or Tylenol for pain control if needed.   If you were fasting, you may resume your normal diet and medications after the procedure  If you have diabetes, check your blood sugar more frequently than usual as your blood sugar may be higher than normal for 10-14 days following a steroid injection. Contact your doctor who manages your diabetes if your blood sugar is higher than usual  Possible side effects of steroids that you may experience include flushing, elevated blood pressure, increased appetite, mild headaches and restlessness.  All of these symptoms will get better with time.  If you experience any of the following, call the Pain Clinic during work hours (Mon-Friday  8-4:30 pm) at [phone] or the Provider Line after hours at [phone]:  -Fever over 100 degree F  -Swelling, bleeding, redness, drainage, warmth at the injection site  -Progressive weakness or numbness in your legs or arms  -Loss of bowel or bladder function  -Unusual headache that is not relieved by Tylenol or other pain reliever  -Unusual new onset of pain that is not improving      Hermelinda, K

## 2024-08-02 NOTE — OB RN TRIAGE NOTE - NSOBDISCHARGEVS_OBGYN_ALL_OB
Subjective:     Lisa Garza is a 87 y.o. female who presents for Hospital Follow-up.    HPI:   Recently hospitalized for pericardial effusion/pleural effusion:    1. S/P TAVR (transcatheter aortic valve replacement)  Patient with PMH of , severe aortic stenosis status post TAVR procedure on July 1.  Patient presented to the emergency room on July 15 with a cough and YOST.  Patient diagnosed with pleural effusion, status postthoracentesis.  Echocardiogram also showed a pericardial effusion and received a pericardial drain.  Patient did not tolerate colchicine due to severe diarrhea.  Patient was discharged with home oxygen, using 2 L nasal cannula at bedtime.  O2 saturations have been within the low 90s.  Patient is undergoing physical therapy with home health, incentive spirometry and ambulation exercises.  Patient has a follow-up echocardiogram and chest x-ray on August 2.  Follow-up with cardiology within the next week.  Patient otherwise feels like she is improving every day.  Sleeping well.  Patient denies chest pain shortness of breath palpitations or edema.    2. Pleural effusion  Patient has a follow-up chest x-ray ordered on August 2.  Continues with home oxygen.    3. Pericardial effusion  Follow-up echocardiogram ordered.      Current medicines (including reconciliation performed today)  Current Outpatient Medications   Medication Sig Dispense Refill    Probiotic Product (PROBIOTIC GUMMIES PO) Take  by mouth.      metoprolol tartrate (LOPRESSOR) 25 MG Tab Take 1 Tablet by mouth 2 times a day. 100 Tablet 3    calcium citrate (CALCITRATE) 950 (200 Ca) MG Tab Take 950 mg by mouth every day. Indications: supplement      vitamin D3 (CHOLECALCIFEROL) 1000 Unit (25 mcg) Tab Take 1,000 Units by mouth every day. Indications: supplement      therapeutic multivitamin-minerals (THERAGRAN-M) Tab Take 1 Tablet by mouth every day. Indications: supplement      acetaminophen (TYLENOL) 500 MG Tab Take 500 mg by mouth  "every 6 hours as needed for Moderate Pain. Indications: Pain      loratadine (CLARITIN) 10 MG Tab Take 10 mg by mouth every day. Indications: Hayfever      simvastatin (ZOCOR) 40 MG Tab TAKE ONE TABLET BY MOUTH IN THE EVENING 100 Tablet 3    rivaroxaban (XARELTO) 20 MG Tab tablet TAKE ONE TABLET BY MOUTH ONE TIME DAILY WITH DINNER 100 Tablet 2     No current facility-administered medications for this visit.       Allergies:   Colchicine, Citrus, Clindamycin, Oatmeal, and Scallops    Social History     Tobacco Use    Smoking status: Former     Current packs/day: 0.00     Average packs/day: 1 pack/day for 33.0 years (33.0 ttl pk-yrs)     Types: Cigarettes     Start date: 1958     Quit date: 1991     Years since quittin.6    Smokeless tobacco: Never    Tobacco comments:     Started smoking at age 22   Vaping Use    Vaping status: Never Used   Substance Use Topics    Alcohol use: Yes     Alcohol/week: 4.2 oz     Types: 7 Glasses of wine per week     Comment: 1 glass wine/day    Drug use: No       ROS:   positive mild fatigue.  Denies all other cardiopulmonary, GI, neurologic symptoms.    Objective:     Vitals:    24 1638   BP: 120/60   BP Location: Right arm   Patient Position: Sitting   Pulse: 68   Resp: 16   Temp: 37 °C (98.6 °F)   TempSrc: Temporal   SpO2: 92%   Weight: 59.7 kg (131 lb 9.6 oz)   Height: 1.651 m (5' 5\")     Body mass index is 21.9 kg/m².    Constitutional: Alert, no distress.  Skin: Warm, dry, good turgor, no rashes in visible areas.  Eye: Equal, round and reactive, conjunctiva clear, lids normal.  ENMT: Lips without lesions, good dentition, oropharynx clear.  Neck: Trachea midline, no masses, no thyromegaly. No cervical or supraclavicular lymphadenopathy  Respiratory: Unlabored respiratory effort, lungs clear to auscultation, no wheezes, no ronchi.  Cardiovascular: Normal S1, S2, positive murmur, no edema.  Abdomen: Soft, non-tender, no masses, no hepatosplenomegaly.  Psych: Alert " and oriented x3, normal affect and mood.      Assessment and Plan:   1. S/P TAVR (transcatheter aortic valve replacement)  Patient is stable.  Continue home health physical therapy, incentive spirometry.  Follow-up with cardiology as scheduled.  Patient to continue all cardiac medications as prescribed.    2. Pleural effusion  Complete follow-up chest x-ray    3. Pericardial effusion  Complete echocardiogram.    - Chart and discharge summary were reviewed.   - Hospitalization and results reviewed with patient.   - Medications reviewed including instructions regarding high risk medications, dosing and side effects.  - Recommended Services: No services needed at this time  - Advance directive/POLST on file?  Yes    Follow-up:No follow-ups on file.    Face-to-face transitional care management services with MODERATE (today's visit is within 14 days post discharge & LACE+ score of 28-58) medical decision complexity were provided.     LACE+ Historical Score Over Time (0-28: Low, 29-58: Medium, 59+: High): 69      Critical Care                   Confirmed that patient received an additional set of vital signs prior to discharge.

## 2024-10-01 NOTE — ED ADULT NURSE NOTE - SUICIDE SCREENING DEPRESSION
Detail Level: Zone
Note Text (......Xxx Chief Complaint.): This diagnosis correlates with the
Render Risk Assessment In Note?: no
Other (Free Text): Discussed PIH post cyst resolving\\nDiscussed atrophy if injected today as AE of ILK\\nRtc 2 weeks if not better
Negative

## 2025-02-11 NOTE — OB RN PATIENT PROFILE - NS PRO RUBELLA CONTRAINDICATIONS OB
Addended by: LAMONTE CA on: 2/11/2025 12:46 PM     Modules accepted: Orders    
titers do not indicate a need to immunize

## 2025-02-17 NOTE — DISCUSSION/SUMMARY
No show appt 2/14. Closing.   
[FreeTextEntry1] : She is 16 weeks and 6 days gestation by her last menstrual period dates.\par \par She is overweight and obesity has been associated with a number of maternal complications such as gestational diabetes, pre-eclampsia, thrombophlebitis, labor abnormalities, post-term pregnancies,  delivery, and operative complications. Obesity has been associated with adverse fetal outcomes such as late stillbirth and  deliveries.  Obese women also have a two to three-fold increased incidence in congenital anomalies. She has been scheduled for a fetal anatomy ultrasound examination. \par \par Regarding her premature birth, I informed her that spontaneous  births are multifactorial since various conditions such as  labor,  rupture of membranes, multiple gestations, abruptio placenta, placenta previa, vaginal bleeding, increased or decreased amniotic fluid volume, fetal anomalies, chorioamnionitis, and incompetent cervix are often associated with  births. The clinical risk factors most often associated with spontaneous  birth are genital tract infection, vaginal bleeding during the second trimester, multiple gestations, and a history of previous  birth.  There are four pathologic pathways that have been associated with  labor and delivery.  The four pathways are maternal infection, maternal or fetal stress, overdistention of the uterus, and decidual hemorrhage.  She was advised to not have sexual intercourse or to use a condom during intercourse to reduce the chance of having another  delivery. She was advised to limit her activities. I told her to avoid standing for prolonged periods of time, avoid heavy lifting and bending. She was also advised to avoid stress during the course of the pregnancy. I gave her  labor precautions. She was advised to present to the hospital if she developed pelvic pressure, back pain, copious vaginal discharge, leaking of vaginal fluid, vaginal bleeding, abdominal tightening, and abdominal cramping pain.\par \par She was made aware that she is at increased risk for having another premature birth. She was informed of the availability of weekly progesterone injections to reduce her risk of having another premature infant. She was told that the progesterone injections did not provide complete protection against prematurity.  She was informed that the progesterone injections could produce mild side effects such as injection site soreness, swelling or bruising. I told her that the progesterone injections are usually started between 16 to 20 weeks of gestation and are continued up to 36 weeks of gestation. She was made aware that some medical insurance companies do not cover the cost of progesterone injections. She declined to receive the progesterone injections.  \par \par She was offered serial cervical length examinations during the second trimester (20, 22, 24, and 26 weeks gestation) to assess her risk of having another  delivery.  She prefers to have serial cervical length examinations.  A transvaginal cervical length measurement was done today and was noted to be within normal limits. \par \par I recommend a glucola challenge test to screen for gestational diabetes between 24 and 28 weeks gestation. I also recommend the Tdap vaccine between 27 and 36 weeks of gestation to prevent pertussis infection in the  infant. I recommend the flu vaccine during flu season (October through May). She should have serial ultrasounds to evaluate fetal growth and development.  \par

## 2025-03-05 NOTE — ED PROVIDER NOTE - INTERNATIONAL TRAVEL
Patient : Kennedi Stein   Age: 38 year old   Sex: female   Admitting Diagnosis: Left sided numbness [R20.0]    Code Status: Not on file   Isolation Precautions: There are no current isolations documented for this patient.  MRN: [id number]  Admit Date: 3/5/2025   ALLERGIES:  Patient has no known allergies.  Chief Complaint   Patient presents with    Stroke Alert     Does the patient have any neurological complaints/symptoms? Left sided facial numbness. Headache. lightheadedness  passed      Events leading to hospitalization: Kennedi Stein is a 38 year old female with history of factor V Leiden deficiency, not on medications, who presents to the ED with complaints of 3 days of head pressure more on the left side, and feeling dizzy which she describes as feeling lightheaded.  No room spinning sensation, no nausea or vomiting.  No vision changes.  No sudden onset or thunderclap headache.  About 1 hour PTA she began feeling numbness in the left side of her face and her left arm felt heavy.  Symptoms still present.  Denies leg symptoms.  No chest pain or shortness of breath, no nausea or vomiting.  No other complaints.   Special Considerations:                                                                                             Is the patient hearing impaired?                                          No  Hearing :                                                                  Is the patient visually impaired?                                          No  Needs :                                                                  No                           Preferred Language: English  Additional Assistance needed:                                              None                                                Comments:                                                                     Safety Concerns: Sitter? no fall risk? no  Mental Status: Alert, Oriented x 4/ Baseline?   Mode of  Transfer: bed  Telemetry: yes, Box # [id number]  EDRN Contact #:     No orders of the defined types were placed in this encounter.      Situation (S)  Lines, Drains, & Airways:  PIV: yes   None  Oxygen to be set at 0 L/min.  O2 Sat: 98.  Is humidifier needed:  no  Airway Status: breathing room air    Patient Restraints:no.  If Yes orders entered: no    Background(B)  Principal Problem:    Left sided numbness     Past Medical History:   Diagnosis Date    Blood clot associated with vein wall inflammation     superficial blood clot s/p iv in arm    BMI 32.0-32.9,adult     Breast lesion 2015    Chronic low back pain     Factor V deficiency  (CMD)     Inflammation of sacroiliac joint (CMD) 2023    Orthostatic hypotension     Vitamin D deficiency      Past Surgical History:   Procedure Laterality Date     delivery+postpartum care      ,  and     Laparoscopy, appendectomy  2013    Leg/ankle surgery proc unlisted Left     x2    Repair peroneal tendons Left 2023    and 2023    Tubal ligation       Assessment(A)    Vital Last Value 24 Hour Range   Temperature 98.7 °F (37.1 °C) (25 1615) Temp  Min: 98.7 °F (37.1 °C)  Max: 98.7 °F (37.1 °C)   Pulse 88 (25 1745) Pulse  Min: 72  Max: 96   Respiratory 18 (25 1745) Resp  Min: 13  Max: 20   Non-Invasive  Blood Pressure 122/75 (25 1745) BP  Min: 110/71  Max: 143/80   Pulse Oximetry 97 % (25 1745) SpO2  Min: 95 %  Max: 99 %   Arterial   Blood Pressure   No data recorded         Last Pain Assessment:   There were no vitals filed for this visit.   Last Pain Medication: n/a  Pain Assessment Tool: Numeric Rating Scale 0-10  Numeric Rating Scale 0-10: 0             Significant Physical Assessment Findings:n/a  Wounds/Injures:n/a    Recommendations  Important Undone Task/Orders:n/a         No

## 2025-07-31 NOTE — ED ADULT NURSE NOTE - CARDIO WDL
cabbage, legumes, onions, broccoli, brussel sprouts, wheat, and potatoes) -[employer]  Stop Lasix  Start Lipitor  Follow up 1 month Diabetic check  Sincerely,  Marysol Cisse MD     Normal rate, regular rhythm

## 2025-08-25 ENCOUNTER — OUTPATIENT (OUTPATIENT)
Dept: OUTPATIENT SERVICES | Facility: HOSPITAL | Age: 35
LOS: 1 days | End: 2025-08-25
Payer: MEDICAID

## 2025-08-25 VITALS
DIASTOLIC BLOOD PRESSURE: 65 MMHG | SYSTOLIC BLOOD PRESSURE: 115 MMHG | RESPIRATION RATE: 16 BRPM | OXYGEN SATURATION: 97 % | HEART RATE: 91 BPM | WEIGHT: 182.98 LBS | HEIGHT: 61 IN | TEMPERATURE: 97 F

## 2025-08-25 DIAGNOSIS — Z01.818 ENCOUNTER FOR OTHER PREPROCEDURAL EXAMINATION: ICD-10-CM

## 2025-08-25 DIAGNOSIS — Z29.9 ENCOUNTER FOR PROPHYLACTIC MEASURES, UNSPECIFIED: ICD-10-CM

## 2025-08-25 DIAGNOSIS — Z30.2 ENCOUNTER FOR STERILIZATION: ICD-10-CM

## 2025-08-25 LAB
A1C WITH ESTIMATED AVERAGE GLUCOSE RESULT: 5.4 % — SIGNIFICANT CHANGE UP (ref 4–5.6)
ALBUMIN SERPL ELPH-MCNC: 4.3 G/DL — SIGNIFICANT CHANGE UP (ref 3.3–5.2)
ALP SERPL-CCNC: 70 U/L — SIGNIFICANT CHANGE UP (ref 40–120)
ALT FLD-CCNC: 25 U/L — SIGNIFICANT CHANGE UP
ANION GAP SERPL CALC-SCNC: 13 MMOL/L — SIGNIFICANT CHANGE UP (ref 5–17)
APPEARANCE UR: ABNORMAL
APTT BLD: 31.4 SEC — SIGNIFICANT CHANGE UP (ref 26.1–36.8)
AST SERPL-CCNC: 22 U/L — SIGNIFICANT CHANGE UP
BACTERIA # UR AUTO: ABNORMAL /HPF
BASOPHILS # BLD AUTO: 0.05 K/UL — SIGNIFICANT CHANGE UP (ref 0–0.2)
BASOPHILS NFR BLD AUTO: 0.5 % — SIGNIFICANT CHANGE UP (ref 0–2)
BILIRUB SERPL-MCNC: 0.2 MG/DL — LOW (ref 0.4–2)
BILIRUB UR-MCNC: NEGATIVE — SIGNIFICANT CHANGE UP
BLD GP AB SCN SERPL QL: SIGNIFICANT CHANGE UP
BUN SERPL-MCNC: 12.1 MG/DL — SIGNIFICANT CHANGE UP (ref 8–20)
CALCIUM SERPL-MCNC: 8.6 MG/DL — SIGNIFICANT CHANGE UP (ref 8.4–10.5)
CHLORIDE SERPL-SCNC: 102 MMOL/L — SIGNIFICANT CHANGE UP (ref 96–108)
CO2 SERPL-SCNC: 23 MMOL/L — SIGNIFICANT CHANGE UP (ref 22–29)
COLOR SPEC: YELLOW — SIGNIFICANT CHANGE UP
CREAT SERPL-MCNC: 0.66 MG/DL — SIGNIFICANT CHANGE UP (ref 0.5–1.3)
DIFF PNL FLD: NEGATIVE — SIGNIFICANT CHANGE UP
EGFR: 117 ML/MIN/1.73M2 — SIGNIFICANT CHANGE UP
EGFR: 117 ML/MIN/1.73M2 — SIGNIFICANT CHANGE UP
EOSINOPHIL # BLD AUTO: 0.26 K/UL — SIGNIFICANT CHANGE UP (ref 0–0.5)
EOSINOPHIL NFR BLD AUTO: 2.4 % — SIGNIFICANT CHANGE UP (ref 0–6)
ESTIMATED AVERAGE GLUCOSE: 108 MG/DL — SIGNIFICANT CHANGE UP (ref 68–114)
GLUCOSE SERPL-MCNC: 94 MG/DL — SIGNIFICANT CHANGE UP (ref 70–99)
GLUCOSE UR QL: NEGATIVE MG/DL — SIGNIFICANT CHANGE UP
HCG SERPL-ACNC: <4 MIU/ML — SIGNIFICANT CHANGE UP
HCG UR QL: NEGATIVE — SIGNIFICANT CHANGE UP
HCT VFR BLD CALC: 41.7 % — SIGNIFICANT CHANGE UP (ref 34.5–45)
HGB BLD-MCNC: 12.8 G/DL — SIGNIFICANT CHANGE UP (ref 11.5–15.5)
IMM GRANULOCYTES # BLD AUTO: 0.04 K/UL — SIGNIFICANT CHANGE UP (ref 0–0.07)
IMM GRANULOCYTES NFR BLD AUTO: 0.4 % — SIGNIFICANT CHANGE UP (ref 0–0.9)
INR BLD: 0.97 RATIO — SIGNIFICANT CHANGE UP (ref 0.85–1.16)
KETONES UR QL: ABNORMAL MG/DL
LEUKOCYTE ESTERASE UR-ACNC: ABNORMAL
LYMPHOCYTES # BLD AUTO: 2.38 K/UL — SIGNIFICANT CHANGE UP (ref 1–3.3)
LYMPHOCYTES NFR BLD AUTO: 22.3 % — SIGNIFICANT CHANGE UP (ref 13–44)
MCHC RBC-ENTMCNC: 25.8 PG — LOW (ref 27–34)
MCHC RBC-ENTMCNC: 30.7 G/DL — LOW (ref 32–36)
MCV RBC AUTO: 83.9 FL — SIGNIFICANT CHANGE UP (ref 80–100)
MONOCYTES # BLD AUTO: 0.6 K/UL — SIGNIFICANT CHANGE UP (ref 0–0.9)
MONOCYTES NFR BLD AUTO: 5.6 % — SIGNIFICANT CHANGE UP (ref 2–14)
NEUTROPHILS # BLD AUTO: 7.34 K/UL — SIGNIFICANT CHANGE UP (ref 1.8–7.4)
NEUTROPHILS NFR BLD AUTO: 68.8 % — SIGNIFICANT CHANGE UP (ref 43–77)
NITRITE UR-MCNC: NEGATIVE — SIGNIFICANT CHANGE UP
NRBC # BLD AUTO: 0 K/UL — SIGNIFICANT CHANGE UP (ref 0–0)
NRBC # FLD: 0 K/UL — SIGNIFICANT CHANGE UP (ref 0–0)
NRBC BLD AUTO-RTO: 0 /100 WBCS — SIGNIFICANT CHANGE UP (ref 0–0)
PH UR: 6 — SIGNIFICANT CHANGE UP (ref 5–8)
PLATELET # BLD AUTO: 319 K/UL — SIGNIFICANT CHANGE UP (ref 150–400)
PMV BLD: 11.5 FL — SIGNIFICANT CHANGE UP (ref 7–13)
POTASSIUM SERPL-MCNC: 4.1 MMOL/L — SIGNIFICANT CHANGE UP (ref 3.5–5.3)
POTASSIUM SERPL-SCNC: 4.1 MMOL/L — SIGNIFICANT CHANGE UP (ref 3.5–5.3)
PROT SERPL-MCNC: 7.3 G/DL — SIGNIFICANT CHANGE UP (ref 6.6–8.7)
PROT UR-MCNC: SIGNIFICANT CHANGE UP MG/DL
PROTHROM AB SERPL-ACNC: 11.3 SEC — SIGNIFICANT CHANGE UP (ref 9.9–13.4)
RBC # BLD: 4.97 M/UL — SIGNIFICANT CHANGE UP (ref 3.8–5.2)
RBC # FLD: 14.4 % — SIGNIFICANT CHANGE UP (ref 10.3–14.5)
RBC CASTS # UR COMP ASSIST: 1 /HPF — SIGNIFICANT CHANGE UP (ref 0–4)
SODIUM SERPL-SCNC: 138 MMOL/L — SIGNIFICANT CHANGE UP (ref 135–145)
SP GR SPEC: 1.03 — SIGNIFICANT CHANGE UP (ref 1–1.03)
SQUAMOUS # UR AUTO: >36 /HPF — HIGH (ref 0–5)
UROBILINOGEN FLD QL: 1 MG/DL — SIGNIFICANT CHANGE UP (ref 0.2–1)
WBC # BLD: 10.67 K/UL — HIGH (ref 3.8–10.5)
WBC # FLD AUTO: 10.67 K/UL — HIGH (ref 3.8–10.5)
WBC UR QL: 26 /HPF — HIGH (ref 0–5)

## 2025-08-25 PROCEDURE — 85610 PROTHROMBIN TIME: CPT

## 2025-08-25 PROCEDURE — 83036 HEMOGLOBIN GLYCOSYLATED A1C: CPT

## 2025-08-25 PROCEDURE — 81001 URINALYSIS AUTO W/SCOPE: CPT

## 2025-08-25 PROCEDURE — 86901 BLOOD TYPING SEROLOGIC RH(D): CPT

## 2025-08-25 PROCEDURE — 87086 URINE CULTURE/COLONY COUNT: CPT

## 2025-08-25 PROCEDURE — 36415 COLL VENOUS BLD VENIPUNCTURE: CPT

## 2025-08-25 PROCEDURE — 85730 THROMBOPLASTIN TIME PARTIAL: CPT

## 2025-08-25 PROCEDURE — 80053 COMPREHEN METABOLIC PANEL: CPT

## 2025-08-25 PROCEDURE — 86850 RBC ANTIBODY SCREEN: CPT

## 2025-08-25 PROCEDURE — 84702 CHORIONIC GONADOTROPIN TEST: CPT

## 2025-08-25 PROCEDURE — 85025 COMPLETE CBC W/AUTO DIFF WBC: CPT

## 2025-08-25 PROCEDURE — G0463: CPT

## 2025-08-25 PROCEDURE — 81025 URINE PREGNANCY TEST: CPT

## 2025-08-25 PROCEDURE — 86900 BLOOD TYPING SEROLOGIC ABO: CPT

## 2025-08-28 LAB
CULTURE RESULTS: SIGNIFICANT CHANGE UP
SPECIMEN SOURCE: SIGNIFICANT CHANGE UP

## 2025-09-01 ENCOUNTER — NON-APPOINTMENT (OUTPATIENT)
Age: 35
End: 2025-09-01

## 2025-09-02 ENCOUNTER — OUTPATIENT (OUTPATIENT)
Dept: OUTPATIENT SERVICES | Facility: HOSPITAL | Age: 35
LOS: 1 days | End: 2025-09-02
Payer: MEDICAID

## 2025-09-02 ENCOUNTER — APPOINTMENT (OUTPATIENT)
Dept: FAMILY MEDICINE | Facility: CLINIC | Age: 35
End: 2025-09-02
Payer: MEDICAID

## 2025-09-02 ENCOUNTER — LABORATORY RESULT (OUTPATIENT)
Age: 35
End: 2025-09-02

## 2025-09-02 VITALS
SYSTOLIC BLOOD PRESSURE: 114 MMHG | WEIGHT: 185 LBS | DIASTOLIC BLOOD PRESSURE: 80 MMHG | HEIGHT: 61 IN | BODY MASS INDEX: 34.93 KG/M2 | OXYGEN SATURATION: 97 % | HEART RATE: 78 BPM | TEMPERATURE: 97 F

## 2025-09-02 DIAGNOSIS — Z3A.16 16 WEEKS GESTATION OF PREGNANCY: ICD-10-CM

## 2025-09-02 DIAGNOSIS — O99.212 OBESITY COMPLICATING PREGNANCY, SECOND TRIMESTER: ICD-10-CM

## 2025-09-02 DIAGNOSIS — E66.3 OVERWEIGHT: ICD-10-CM

## 2025-09-02 DIAGNOSIS — R10.9 UNSPECIFIED ABDOMINAL PAIN: ICD-10-CM

## 2025-09-02 DIAGNOSIS — E55.9 VITAMIN D DEFICIENCY, UNSPECIFIED: ICD-10-CM

## 2025-09-02 DIAGNOSIS — O09.891 SUPERVISION OF OTHER HIGH RISK PREGNANCIES, FIRST TRIMESTER: ICD-10-CM

## 2025-09-02 DIAGNOSIS — H61.23 IMPACTED CERUMEN, BILATERAL: ICD-10-CM

## 2025-09-02 DIAGNOSIS — Z01.812 ENCOUNTER FOR PREPROCEDURAL LABORATORY EXAMINATION: ICD-10-CM

## 2025-09-02 DIAGNOSIS — Z00.00 ENCOUNTER FOR GENERAL ADULT MEDICAL EXAMINATION W/OUT ABNORMAL FINDINGS: ICD-10-CM

## 2025-09-02 DIAGNOSIS — R53.83 OTHER FATIGUE: ICD-10-CM

## 2025-09-02 PROBLEM — O03.9 COMPLETE OR UNSPECIFIED SPONTANEOUS ABORTION WITHOUT COMPLICATION: Chronic | Status: ACTIVE | Noted: 2025-08-25

## 2025-09-02 PROCEDURE — 87086 URINE CULTURE/COLONY COUNT: CPT

## 2025-09-02 PROCEDURE — 69209 REMOVE IMPACTED EAR WAX UNI: CPT | Mod: 50

## 2025-09-02 PROCEDURE — 99385 PREV VISIT NEW AGE 18-39: CPT | Mod: 25

## 2025-09-03 LAB
CULTURE RESULTS: SIGNIFICANT CHANGE UP
SPECIMEN SOURCE: SIGNIFICANT CHANGE UP

## 2025-09-04 LAB
25(OH)D3 SERPL-MCNC: 11.2 NG/ML
ALBUMIN SERPL ELPH-MCNC: 4.6 G/DL
ALBUMIN, RANDOM URINE: 1.9 MG/DL
ALP BLD-CCNC: 70 U/L
ALT SERPL-CCNC: 31 U/L
ANION GAP SERPL CALC-SCNC: 15 MMOL/L
APPEARANCE: CLEAR
AST SERPL-CCNC: 27 U/L
BASOPHILS # BLD AUTO: 0.05 K/UL
BASOPHILS NFR BLD AUTO: 0.4 %
BILIRUB SERPL-MCNC: <0.2 MG/DL
BILIRUBIN URINE: NEGATIVE
BLOOD URINE: NEGATIVE
BUN SERPL-MCNC: 11 MG/DL
C PEPTIDE SERPL-MCNC: 6.2 NG/ML
CALCIUM SERPL-MCNC: 9.5 MG/DL
CHLORIDE SERPL-SCNC: 103 MMOL/L
CHOLEST SERPL-MCNC: 198 MG/DL
CO2 SERPL-SCNC: 21 MMOL/L
COLOR: YELLOW
CREAT SERPL-MCNC: 0.65 MG/DL
CREAT SPEC-SCNC: 195 MG/DL
EGFRCR SERPLBLD CKD-EPI 2021: 117 ML/MIN/1.73M2
EOSINOPHIL # BLD AUTO: 0.17 K/UL
EOSINOPHIL NFR BLD AUTO: 1.5 %
ESTIMATED AVERAGE GLUCOSE: 117 MG/DL
FERRITIN SERPL-MCNC: 19 NG/ML
FOLATE SERPL-MCNC: 5.8 NG/ML
GLUCOSE QUALITATIVE U: NEGATIVE MG/DL
GLUCOSE SERPL-MCNC: 103 MG/DL
HBA1C MFR BLD HPLC: 5.7 %
HCT VFR BLD CALC: 45.6 %
HDLC SERPL-MCNC: 46 MG/DL
HGB BLD-MCNC: 13.9 G/DL
IMM GRANULOCYTES NFR BLD AUTO: 0.4 %
IRON SATN MFR SERPL: 6 %
IRON SERPL-MCNC: 27 UG/DL
KETONES URINE: NEGATIVE MG/DL
LDLC SERPL-MCNC: 127 MG/DL
LEUKOCYTE ESTERASE URINE: ABNORMAL
LYMPHOCYTES # BLD AUTO: 3.23 K/UL
LYMPHOCYTES NFR BLD AUTO: 28.6 %
MAN DIFF?: NORMAL
MCHC RBC-ENTMCNC: 26.2 PG
MCHC RBC-ENTMCNC: 30.5 G/DL
MCV RBC AUTO: 86 FL
MICROALBUMIN/CREAT 24H UR-RTO: 10 MG/G
MONOCYTES # BLD AUTO: 0.45 K/UL
MONOCYTES NFR BLD AUTO: 4 %
NEUTROPHILS # BLD AUTO: 7.33 K/UL
NEUTROPHILS NFR BLD AUTO: 65.1 %
NITRITE URINE: NEGATIVE
NONHDLC SERPL-MCNC: 152 MG/DL
PH URINE: 6
PLATELET # BLD AUTO: 319 K/UL
POTASSIUM SERPL-SCNC: 4.8 MMOL/L
PROT SERPL-MCNC: 7.8 G/DL
PROTEIN URINE: NEGATIVE MG/DL
RBC # BLD: 5.3 M/UL
RBC # FLD: 14.7 %
SODIUM SERPL-SCNC: 139 MMOL/L
SPECIFIC GRAVITY URINE: 1.03
T4 SERPL-MCNC: 7.9 UG/DL
TIBC SERPL-MCNC: 443 UG/DL
TRIGL SERPL-MCNC: 142 MG/DL
TSH SERPL-ACNC: 0.49 UIU/ML
UIBC SERPL-MCNC: 416 UG/DL
UROBILINOGEN URINE: 0.2 MG/DL
VIT B12 SERPL-MCNC: 453 PG/ML
WBC # FLD AUTO: 11.28 K/UL

## 2025-09-05 ENCOUNTER — APPOINTMENT (OUTPATIENT)
Dept: FAMILY MEDICINE | Facility: CLINIC | Age: 35
End: 2025-09-05

## 2025-09-05 VITALS
DIASTOLIC BLOOD PRESSURE: 80 MMHG | WEIGHT: 182 LBS | HEIGHT: 61 IN | OXYGEN SATURATION: 98 % | BODY MASS INDEX: 34.36 KG/M2 | SYSTOLIC BLOOD PRESSURE: 114 MMHG | TEMPERATURE: 97.8 F | HEART RATE: 84 BPM

## 2025-09-05 DIAGNOSIS — Z01.818 ENCOUNTER FOR OTHER PREPROCEDURAL EXAMINATION: ICD-10-CM

## 2025-09-05 DIAGNOSIS — Z78.9 OTHER SPECIFIED HEALTH STATUS: ICD-10-CM

## 2025-09-05 DIAGNOSIS — Z83.3 FAMILY HISTORY OF DIABETES MELLITUS: ICD-10-CM

## 2025-09-05 DIAGNOSIS — Z82.49 FAMILY HISTORY OF ISCHEMIC HEART DISEASE AND OTHER DISEASES OF THE CIRCULATORY SYSTEM: ICD-10-CM

## 2025-09-05 DIAGNOSIS — Z30.2 ENCOUNTER FOR STERILIZATION: ICD-10-CM

## 2025-09-05 PROCEDURE — G2211 COMPLEX E/M VISIT ADD ON: CPT | Mod: NC

## 2025-09-05 PROCEDURE — 99214 OFFICE O/P EST MOD 30 MIN: CPT

## 2025-09-11 ENCOUNTER — TRANSCRIPTION ENCOUNTER (OUTPATIENT)
Age: 35
End: 2025-09-11